# Patient Record
Sex: MALE | Race: WHITE | NOT HISPANIC OR LATINO
[De-identification: names, ages, dates, MRNs, and addresses within clinical notes are randomized per-mention and may not be internally consistent; named-entity substitution may affect disease eponyms.]

---

## 2020-12-10 ENCOUNTER — TRANSCRIPTION ENCOUNTER (OUTPATIENT)
Age: 85
End: 2020-12-10

## 2023-01-26 ENCOUNTER — INPATIENT (INPATIENT)
Facility: HOSPITAL | Age: 88
LOS: 1 days | Discharge: HOME | End: 2023-01-28
Attending: SURGERY | Admitting: SURGERY
Payer: MEDICARE

## 2023-01-26 ENCOUNTER — EMERGENCY (EMERGENCY)
Facility: HOSPITAL | Age: 88
LOS: 0 days | Discharge: ANOTHER HOSPITAL | End: 2023-01-26
Attending: STUDENT IN AN ORGANIZED HEALTH CARE EDUCATION/TRAINING PROGRAM
Payer: MEDICARE

## 2023-01-26 VITALS
RESPIRATION RATE: 19 BRPM | HEART RATE: 95 BPM | OXYGEN SATURATION: 100 % | SYSTOLIC BLOOD PRESSURE: 155 MMHG | TEMPERATURE: 98 F | DIASTOLIC BLOOD PRESSURE: 78 MMHG

## 2023-01-26 VITALS
WEIGHT: 184.97 LBS | TEMPERATURE: 98 F | RESPIRATION RATE: 18 BRPM | DIASTOLIC BLOOD PRESSURE: 86 MMHG | HEIGHT: 69 IN | OXYGEN SATURATION: 98 % | HEART RATE: 94 BPM | SYSTOLIC BLOOD PRESSURE: 122 MMHG

## 2023-01-26 VITALS
RESPIRATION RATE: 18 BRPM | SYSTOLIC BLOOD PRESSURE: 144 MMHG | HEART RATE: 88 BPM | DIASTOLIC BLOOD PRESSURE: 88 MMHG | OXYGEN SATURATION: 96 % | HEIGHT: 69 IN | TEMPERATURE: 98 F

## 2023-01-26 DIAGNOSIS — R53.83 OTHER FATIGUE: ICD-10-CM

## 2023-01-26 DIAGNOSIS — Z20.822 CONTACT WITH AND (SUSPECTED) EXPOSURE TO COVID-19: ICD-10-CM

## 2023-01-26 DIAGNOSIS — R53.1 WEAKNESS: ICD-10-CM

## 2023-01-26 DIAGNOSIS — R10.31 RIGHT LOWER QUADRANT PAIN: ICD-10-CM

## 2023-01-26 LAB
ALBUMIN SERPL ELPH-MCNC: 2.5 G/DL — LOW (ref 3.3–5)
ALP SERPL-CCNC: 96 U/L — SIGNIFICANT CHANGE UP (ref 40–120)
ALT FLD-CCNC: 41 U/L — SIGNIFICANT CHANGE UP (ref 12–78)
ANION GAP SERPL CALC-SCNC: 10 MMOL/L — SIGNIFICANT CHANGE UP (ref 5–17)
APPEARANCE UR: CLEAR — SIGNIFICANT CHANGE UP
AST SERPL-CCNC: 43 U/L — HIGH (ref 15–37)
BACTERIA # UR AUTO: ABNORMAL
BASOPHILS # BLD AUTO: 0.11 K/UL — SIGNIFICANT CHANGE UP (ref 0–0.2)
BASOPHILS NFR BLD AUTO: 0.6 % — SIGNIFICANT CHANGE UP (ref 0–2)
BILIRUB SERPL-MCNC: 1.3 MG/DL — HIGH (ref 0.2–1.2)
BILIRUB UR-MCNC: NEGATIVE — SIGNIFICANT CHANGE UP
BUN SERPL-MCNC: 10 MG/DL — SIGNIFICANT CHANGE UP (ref 7–23)
CALCIUM SERPL-MCNC: 9 MG/DL — SIGNIFICANT CHANGE UP (ref 8.5–10.1)
CHLORIDE SERPL-SCNC: 93 MMOL/L — LOW (ref 96–108)
CO2 SERPL-SCNC: 32 MMOL/L — HIGH (ref 22–31)
COLOR SPEC: YELLOW — SIGNIFICANT CHANGE UP
CREAT SERPL-MCNC: 0.76 MG/DL — SIGNIFICANT CHANGE UP (ref 0.5–1.3)
DIFF PNL FLD: NEGATIVE — SIGNIFICANT CHANGE UP
EGFR: 88 ML/MIN/1.73M2 — SIGNIFICANT CHANGE UP
EOSINOPHIL # BLD AUTO: 0.04 K/UL — SIGNIFICANT CHANGE UP (ref 0–0.5)
EOSINOPHIL NFR BLD AUTO: 0.2 % — SIGNIFICANT CHANGE UP (ref 0–6)
EPI CELLS # UR: SIGNIFICANT CHANGE UP
FLUAV AG NPH QL: SIGNIFICANT CHANGE UP
FLUBV AG NPH QL: SIGNIFICANT CHANGE UP
GLUCOSE SERPL-MCNC: 143 MG/DL — HIGH (ref 70–99)
GLUCOSE UR QL: NEGATIVE MG/DL — SIGNIFICANT CHANGE UP
HCT VFR BLD CALC: 40.9 % — SIGNIFICANT CHANGE UP (ref 39–50)
HGB BLD-MCNC: 14 G/DL — SIGNIFICANT CHANGE UP (ref 13–17)
IMM GRANULOCYTES NFR BLD AUTO: 1.2 % — HIGH (ref 0–0.9)
KETONES UR-MCNC: ABNORMAL
LACTATE SERPL-SCNC: 1.4 MMOL/L — SIGNIFICANT CHANGE UP (ref 0.7–2)
LEUKOCYTE ESTERASE UR-ACNC: NEGATIVE — SIGNIFICANT CHANGE UP
LIDOCAIN IGE QN: 138 U/L — SIGNIFICANT CHANGE UP (ref 73–393)
LYMPHOCYTES # BLD AUTO: 1.59 K/UL — SIGNIFICANT CHANGE UP (ref 1–3.3)
LYMPHOCYTES # BLD AUTO: 9.1 % — LOW (ref 13–44)
MAGNESIUM SERPL-MCNC: 2.2 MG/DL — SIGNIFICANT CHANGE UP (ref 1.6–2.6)
MCHC RBC-ENTMCNC: 32.9 PG — SIGNIFICANT CHANGE UP (ref 27–34)
MCHC RBC-ENTMCNC: 34.2 G/DL — SIGNIFICANT CHANGE UP (ref 32–36)
MCV RBC AUTO: 96 FL — SIGNIFICANT CHANGE UP (ref 80–100)
MONOCYTES # BLD AUTO: 0.97 K/UL — HIGH (ref 0–0.9)
MONOCYTES NFR BLD AUTO: 5.6 % — SIGNIFICANT CHANGE UP (ref 2–14)
NEUTROPHILS # BLD AUTO: 14.5 K/UL — HIGH (ref 1.8–7.4)
NEUTROPHILS NFR BLD AUTO: 83.3 % — HIGH (ref 43–77)
NITRITE UR-MCNC: NEGATIVE — SIGNIFICANT CHANGE UP
NRBC # BLD: 0 /100 WBCS — SIGNIFICANT CHANGE UP (ref 0–0)
PH UR: 6 — SIGNIFICANT CHANGE UP (ref 5–8)
PLATELET # BLD AUTO: 536 K/UL — HIGH (ref 150–400)
POTASSIUM SERPL-MCNC: 2.9 MMOL/L — CRITICAL LOW (ref 3.5–5.3)
POTASSIUM SERPL-SCNC: 2.9 MMOL/L — CRITICAL LOW (ref 3.5–5.3)
PROT SERPL-MCNC: 7.4 GM/DL — SIGNIFICANT CHANGE UP (ref 6–8.3)
PROT UR-MCNC: 30 MG/DL
RBC # BLD: 4.26 M/UL — SIGNIFICANT CHANGE UP (ref 4.2–5.8)
RBC # FLD: 14.1 % — SIGNIFICANT CHANGE UP (ref 10.3–14.5)
RBC CASTS # UR COMP ASSIST: SIGNIFICANT CHANGE UP /HPF (ref 0–4)
SARS-COV-2 RNA SPEC QL NAA+PROBE: SIGNIFICANT CHANGE UP
SODIUM SERPL-SCNC: 135 MMOL/L — SIGNIFICANT CHANGE UP (ref 135–145)
SP GR SPEC: 1.02 — SIGNIFICANT CHANGE UP (ref 1.01–1.02)
UROBILINOGEN FLD QL: 8 MG/DL
WBC # BLD: 17.42 K/UL — HIGH (ref 3.8–10.5)
WBC # FLD AUTO: 17.42 K/UL — HIGH (ref 3.8–10.5)
WBC UR QL: SIGNIFICANT CHANGE UP

## 2023-01-26 PROCEDURE — 99285 EMERGENCY DEPT VISIT HI MDM: CPT

## 2023-01-26 PROCEDURE — 70450 CT HEAD/BRAIN W/O DYE: CPT | Mod: 26,MA

## 2023-01-26 PROCEDURE — 93010 ELECTROCARDIOGRAM REPORT: CPT

## 2023-01-26 PROCEDURE — 76700 US EXAM ABDOM COMPLETE: CPT | Mod: 26

## 2023-01-26 PROCEDURE — 74177 CT ABD & PELVIS W/CONTRAST: CPT | Mod: 26,MA

## 2023-01-26 PROCEDURE — 99283 EMERGENCY DEPT VISIT LOW MDM: CPT

## 2023-01-26 RX ORDER — POTASSIUM CHLORIDE 20 MEQ
10 PACKET (EA) ORAL ONCE
Refills: 0 | Status: COMPLETED | OUTPATIENT
Start: 2023-01-26 | End: 2023-01-26

## 2023-01-26 RX ORDER — PIPERACILLIN AND TAZOBACTAM 4; .5 G/20ML; G/20ML
3.38 INJECTION, POWDER, LYOPHILIZED, FOR SOLUTION INTRAVENOUS ONCE
Refills: 0 | Status: COMPLETED | OUTPATIENT
Start: 2023-01-26 | End: 2023-01-26

## 2023-01-26 RX ORDER — SODIUM CHLORIDE 9 MG/ML
1000 INJECTION INTRAMUSCULAR; INTRAVENOUS; SUBCUTANEOUS ONCE
Refills: 0 | Status: COMPLETED | OUTPATIENT
Start: 2023-01-26 | End: 2023-01-26

## 2023-01-26 RX ORDER — POTASSIUM CHLORIDE 20 MEQ
40 PACKET (EA) ORAL ONCE
Refills: 0 | Status: COMPLETED | OUTPATIENT
Start: 2023-01-26 | End: 2023-01-26

## 2023-01-26 RX ADMIN — Medication 100 MILLIEQUIVALENT(S): at 12:37

## 2023-01-26 RX ADMIN — SODIUM CHLORIDE 1000 MILLILITER(S): 9 INJECTION INTRAMUSCULAR; INTRAVENOUS; SUBCUTANEOUS at 11:32

## 2023-01-26 RX ADMIN — Medication 40 MILLIEQUIVALENT(S): at 12:36

## 2023-01-26 RX ADMIN — SODIUM CHLORIDE 1000 MILLILITER(S): 9 INJECTION INTRAMUSCULAR; INTRAVENOUS; SUBCUTANEOUS at 12:32

## 2023-01-26 RX ADMIN — PIPERACILLIN AND TAZOBACTAM 200 GRAM(S): 4; .5 INJECTION, POWDER, LYOPHILIZED, FOR SOLUTION INTRAVENOUS at 15:25

## 2023-01-26 RX ADMIN — Medication 10 MILLIEQUIVALENT(S): at 13:37

## 2023-01-26 RX ADMIN — PIPERACILLIN AND TAZOBACTAM 3.38 GRAM(S): 4; .5 INJECTION, POWDER, LYOPHILIZED, FOR SOLUTION INTRAVENOUS at 15:55

## 2023-01-26 NOTE — CONSULT NOTE ADULT - SUBJECTIVE AND OBJECTIVE BOX
GENERAL SURGERY CONSULT NOTE    Patient is a 88 year old male who presents with a chief complaint of weakness.    HPI: 89 y/o male with no PMHx or PSHx bought in by his son for weakness. Per patient, he has no abdominal pain, complaints, or weakness. Tolerating diet. Normal BM/flatus. Patient denies fever, chills, nausea, vomiting, constipation, diarrhea, melena, hematochezia, dysuria, hematuria, chest pain, shortness of breath, dizziness, cough. Patient denies prior incident.     REVIEW OF SYSTEMS:  CONSTITUTIONAL: No fever, weight loss, or fatigue  EYES: No eye pain, visual disturbances, discharge  ENMT:  No difficulty hearing, tinnitus, vertigo; No sinus or throat pain  NECK: No pain or stiffness  BREASTS: No pain, masses, or nipple discharge  RESPIRATORY: No cough, wheezing, chills or hemoptysis; No shortness of breath  CARDIOVASCULAR: No chest pain, palpitations, dizziness, or leg swelling  GASTROINTESTINAL: No abdominal or epigastric pain. No nausea, vomiting, or hematemesis; No diarrhea or constipation. No melena or hematochezia.  GENITOURINARY: No dysuria, frequency, hematuria, or incontinence  NEUROLOGICAL: No headaches, memory loss, loss of strength, numbness, or tremors  SKIN: No itching, burning, rashes, or lesions   LYMPH NODES: No enlarged glands  ENDOCRINE: No heat or cold intolerance; No hair loss  MUSCULOSKELETAL: No joint pain or swelling; No muscle, back, or extremity pain  PSYCHIATRIC: No depression, anxiety, mood swings, or difficulty sleeping  HEME/LYMPH: No easy bruising, or bleeding gums  ALLERY AND IMMUNOLOGIC: No hives or eczema     PAST MEDICAL & SURGICAL HISTORY: No hx.    MEDICATIONS: No medications.    Allergies  No Known Allergies    SOCIAL HISTORY: Former smoker, quit many years ago. Denies ETOH or drugs.           FAMILY HISTORY: No known family hx.      Vital Signs Last 24 Hrs  T(C): 36.7 (2023 15:20), Max: 36.7 (2023 15:20)  T(F): 98 (2023 15:20), Max: 98 (2023 15:20)  HR: 78 (2023 18:15) (67 - 94)  BP: 153/91 (2023 18:15) (122/86 - 153/91)  BP(mean): --  RR: 22 (2023 18:15) (16 - 22)  SpO2: 96% (2023 18:15) (96% - 99%)    Parameters below as of 2023 18:15  Patient On (Oxygen Delivery Method): room air      PHYSICAL EXAM:  CONSTITUTIONAL: NAD, well-developed  HEAD:  Atraumatic, Normocephalic  EYES: Conjunctiva and sclera clear  ENMT: No tonsillar erythema, exudates, or enlargement; Moist mucous membranes, No lesions  NECK: Supple, No JVD, Normal thyroid  NERVOUS SYSTEM:  Alert & Oriented X3  RESPIRATORY: Clear to auscultation bilaterally; No rales, rhonchi, wheezing  CARDIOVASCULAR: Regular rate and rhythm. S1S2  GASTROINTESTINAL: Nondistended, +BS, soft, nontender, no guarding, no rigidity   MUSCULOSKELETAL: 2+ Peripheral Pulses, No clubbing, cyanosis, or edema     LABS:                        14.0   17.42 )-----------( 536      ( 2023 11:25 )             40.9         135  |  93<L>  |  10  ----------------------------<  143<H>  2.9<LL>   |  32<H>  |  0.76    Ca    9.0      2023 11:25  Mg     2.2         TPro  7.4  /  Alb  2.5<L>  /  TBili  1.3<H>  /  DBili  x   /  AST  43<H>  /  ALT  41  /  AlkPhos  96        Urinalysis Basic - ( 2023 12:54 )    Color: Yellow / Appearance: Clear / S.020 / pH: x  Gluc: x / Ketone: Small  / Bili: Negative / Urobili: 8 mg/dL   Blood: x / Protein: 30 mg/dL / Nitrite: Negative   Leuk Esterase: Negative / RBC: 0-2 /HPF / WBC 0-2   Sq Epi: x / Non Sq Epi: Few / Bacteria: Few    < from: US Abdomen Complete (23 @ 17:21) >  FINDINGS:  Liver: Within normal limits.  Bile ducts: Normal caliber.  Gallbladder: Not visualized. (Findings on CT consistent with generalized   inflammatory process. May obtain cholescintigraphy as clinically   indicated)  Pancreas: Peripancreatic inflammation  Spleen: . Within normal limits.  Right kidney: 12.2 cm. No hydronephrosis.  Left kidney: 12.5 cm. No hydronephrosis. 2.5 cm cyst  Ascites: Partially visualized collection medial to the spleen as   demonstrated on prior CT scan.  Aorta and IVC: Visualized portions are within normal limits.    IMPRESSION:  Gallbladder not visualized.    Partially visualized peripancreatic inflammation as previously   demonstrated    < end of copied text >    < from: CT Head No Cont (23 @ 13:58) >    IMPRESSION:    1)  chronic ischemic changes with volume loss. No acute abnormality   suggested..  2)  no intracerebral hemorrhage, contusion, or acute hemorrhagic   collections identified.    < end of copied text >    < from: CT Abdomen and Pelvis w/ IV Cont (23 @ 13:58) >  FINDINGS:  LOWER CHEST: Bibasilar atelectasis. Ascending aortic aneurysm measuring   5.8 x 5.8 cm, partially visualized. Coronary artery calcifications.    LIVER: Within normal limits.  BILE DUCTS: Normal caliber.  GALLBLADDER: Cholelithiasis. Nonspecific diffuse gallbladder wall   thickening.  SPLEEN: Within normal limits.  PANCREAS: Moderate, loculated peripancreatic fluid collection which   extends into the splenic hilum, along the mesenteric root, and along the   SMV branches extending to the right lower quadrant, overall measuring   approximately 18.7 x 18.4 cm in extent. Normal pancreatic parenchymal   enhancement. No pancreatic ductal dilatation. Scattered pancreatic   parenchymal calcifications, likely sequela of chronic.  ADRENALS: Within normal limits.  KIDNEYS/URETERS: Bilateral renal cysts measuring up to 4.9 cm and the   right kidney. No hydronephrosis    BLADDER: Mild diffuse bladder wall thickening and trabeculation.  REPRODUCTIVE ORGANS: Prostate is enlarged.    BOWEL: No bowel obstruction. Appendix is normal. Colonic diverticulosis   without evidence of acute diverticulitis.  PERITONEUM: Trace pelvic free fluid.  VESSELS: Atherosclerotic changes.  RETROPERITONEUM/LYMPH NODES: No lymphadenopathy.  ABDOMINAL WALL: Within normal limits.  BONES: Degenerative changes.    IMPRESSION:  18.7 cm loculated yessica-pancreatic collection, which extends into the   splenic hilum, along the mesenteric root and along branches of the SMV   extending to the right lower quadrant. Findings likely secondary to   recent acute interstitial pancreatitis, with no findings to suggest   pancreatic or peripancreatic necrosis.    Cholelithiasis. Nonspecific diffuse gallbladder wall thickening. If there   is concern for acute cholecystitis, recommend right upper quadrant   ultrasound.    Partially visualized, ascending aortic aneurysm measuring up to 5.8 cm in   diameter.    < end of copied text >

## 2023-01-26 NOTE — ED ADULT TRIAGE NOTE - CCCP TRG CHIEF CMPLNT
· Refill requested by: Patient  · Last office visit for Cardio/Vasodilators: 3/15/2021   · Next office visit: none   · Medication(s) Requested: trandolapril 4 mg, verapamil 240 mg   · Last refill: 4/9/2021  · Dosage: see above   · Sig:  Take 1 tab by mouth daily   · Quantity requested: 90  Last 2 blood pressure readings:    BP Readings from Last 2 Encounters:   03/30/21 118/70   03/15/21 100/62     BP<140/90 at last OV/Nurse Visit  Refill if seen within the last 6 months  Filled per protocol    
transfer

## 2023-01-26 NOTE — CONSULT NOTE ADULT - ASSESSMENT
89 y/o male with no PMHx or PSHx bought in by his son for weakness. CT shows 18.7 cm loculated yessica-pancreatic collection.    Plan:   Recommend transfer to tertiary facility   Discussed with Dr. Joyner

## 2023-01-26 NOTE — ED ADULT TRIAGE NOTE - CHIEF COMPLAINT QUOTE
Pt transfer from [facility], c/o generalized weakness, fatigue, loss of appetite and RLQ abd pain, per imaging found to have pancreatitis and ascending aortic aneurysm, here for surgical consult. Arrived w/ 20G IV on left forearm. Denies PMH

## 2023-01-26 NOTE — ED ADULT NURSE NOTE - NSIMPLEMENTINTERV_GEN_ALL_ED
At risk for secondary malignancy At risk for secondary malignancy Implemented All Fall with Harm Risk Interventions:  [place] to call system. Call bell, personal items and telephone within reach. Instruct patient to call for assistance. Room bathroom lighting operational. Non-slip footwear when patient is off stretcher. Physically safe environment: no spills, clutter or unnecessary equipment. Stretcher in lowest position, wheels locked, appropriate side rails in place. Provide visual cue, wrist band, yellow gown, etc. Monitor gait and stability. Monitor for mental status changes and reorient to person, place, and time. Review medications for side effects contributing to fall risk. Reinforce activity limits and safety measures with patient and family. Provide visual clues: red socks.

## 2023-01-26 NOTE — ED PROVIDER NOTE - PHYSICAL EXAMINATION
General appearance: Nontoxic appearing, conversant, afebrile    Eyes: anicteric sclerae, PRINCESS, EOMI   HENT: Atraumatic; oropharynx clear, MMM and no ulcerations, no pharyngeal erythema or exudate   Neck: Trachea midline; Full range of motion, supple   Pulm: CTA bl, normal respiratory effort and no intercostal retractions, normal work of breathing   CV: RRR, No murmurs, rubs, or gallops   Abdomen: Soft, mild rlq tender, non-distended; no guarding or rebound   Extremities: No peripheral edema, no gross deformities, FROM x4   Skin: Dry, normal temperature, turgor and texture; no rash   Psych: Appropriate affect, cooperative

## 2023-01-26 NOTE — ED ADULT NURSE NOTE - OBJECTIVE STATEMENT
88M presents to the ED with intermittent weakness x 2 weeks, low appetite, also fell last night, patient states that he did not hit his head but is unsure how he fell. denies pain, headache, blurry vision

## 2023-01-26 NOTE — ED PROVIDER NOTE - OBJECTIVE STATEMENT
87yo male with no pmh presenting with generalized weakness, abdominal pain.  Has been feeling fatigued and generalized weakness for some time but starting 1/20 began having abdominal pain.  Pain waxing and waning and mainly rlq.  Went to UC and given a few medications, abx? but unsure which.  Says with one of the medications he felt like he was hallucinating so stopped.  Here today for persistent symptoms and he fell last night.  Denies head strike, pain from fall, no ac use, no fevers, cp, sob, n/v/d, urinary symptoms.  No pshx.

## 2023-01-26 NOTE — ED PROVIDER NOTE - CLINICAL SUMMARY MEDICAL DECISION MAKING FREE TEXT BOX
Presenting with progressive generalized weakness, fatigue.  Abdominal pain with mild rlq ttp although ongoing for several days.  Lower suspicion acute intraabdominal pathology but will image given age.  + Fall patient states from the generalized weakness, no signs of significant traumatic injuries and denies pain.  Afebrile.  Plan for labs with CT, ivf.  Offered pain meds and patient declined.  Reassess after results for dispo.

## 2023-01-26 NOTE — ED ADULT TRIAGE NOTE - CHIEF COMPLAINT QUOTE
pt c/o generalize weakness, fatigue, loss of appetite and intermittent confusion for 2 weeks. pt had covid 6 weeks ago. as per sons, no history.

## 2023-01-26 NOTE — ED PROVIDER NOTE - CARE PLAN
Is This A New Presentation, Or A Follow-Up?: Skin Lesion Additional History: PT comes in with a spot on his nose. 1 Principal Discharge DX:	Abdominal pain

## 2023-01-26 NOTE — ED ADULT NURSE NOTE - NS ED NURSE DISCH DISPOSITION
Implemented All Universal Safety Interventions:  [place] to call system. Call bell, personal items and telephone within reach. Instruct patient to call for assistance. Room bathroom lighting operational. Non-slip footwear when patient is off stretcher. Physically safe environment: no spills, clutter or unnecessary equipment. Stretcher in lowest position, wheels locked, appropriate side rails in place. Transferred

## 2023-01-26 NOTE — ED PROVIDER NOTE - PROGRESS NOTE DETAILS
[place]: CT findings noted.  D/w surgery here and would recommend transfer for higher level of care.  Remains stable at this time.  Discussed plan with patient and family and answered questions.

## 2023-01-27 DIAGNOSIS — K85.90 ACUTE PANCREATITIS WITHOUT NECROSIS OR INFECTION, UNSPECIFIED: ICD-10-CM

## 2023-01-27 LAB
ALBUMIN SERPL ELPH-MCNC: 3 G/DL — LOW (ref 3.3–5)
ALP SERPL-CCNC: 85 U/L — SIGNIFICANT CHANGE UP (ref 40–120)
ALT FLD-CCNC: 25 U/L — SIGNIFICANT CHANGE UP (ref 4–41)
ANION GAP SERPL CALC-SCNC: 11 MMOL/L — SIGNIFICANT CHANGE UP (ref 7–14)
AST SERPL-CCNC: 39 U/L — SIGNIFICANT CHANGE UP (ref 4–40)
BILIRUB SERPL-MCNC: 0.8 MG/DL — SIGNIFICANT CHANGE UP (ref 0.2–1.2)
BUN SERPL-MCNC: 10 MG/DL — SIGNIFICANT CHANGE UP (ref 7–23)
CALCIUM SERPL-MCNC: 8.7 MG/DL — SIGNIFICANT CHANGE UP (ref 8.4–10.5)
CHLORIDE SERPL-SCNC: 96 MMOL/L — LOW (ref 98–107)
CO2 SERPL-SCNC: 26 MMOL/L — SIGNIFICANT CHANGE UP (ref 22–31)
CREAT SERPL-MCNC: 0.57 MG/DL — SIGNIFICANT CHANGE UP (ref 0.5–1.3)
CULTURE RESULTS: SIGNIFICANT CHANGE UP
EGFR: 95 ML/MIN/1.73M2 — SIGNIFICANT CHANGE UP
GLUCOSE SERPL-MCNC: 108 MG/DL — HIGH (ref 70–99)
HCT VFR BLD CALC: 37.4 % — LOW (ref 39–50)
HGB BLD-MCNC: 12.3 G/DL — LOW (ref 13–17)
MAGNESIUM SERPL-MCNC: 1.9 MG/DL — SIGNIFICANT CHANGE UP (ref 1.6–2.6)
MCHC RBC-ENTMCNC: 31.9 PG — SIGNIFICANT CHANGE UP (ref 27–34)
MCHC RBC-ENTMCNC: 32.9 GM/DL — SIGNIFICANT CHANGE UP (ref 32–36)
MCV RBC AUTO: 96.9 FL — SIGNIFICANT CHANGE UP (ref 80–100)
NRBC # BLD: 0 /100 WBCS — SIGNIFICANT CHANGE UP (ref 0–0)
NRBC # FLD: 0 K/UL — SIGNIFICANT CHANGE UP (ref 0–0)
PHOSPHATE SERPL-MCNC: 3 MG/DL — SIGNIFICANT CHANGE UP (ref 2.5–4.5)
PLATELET # BLD AUTO: 464 K/UL — HIGH (ref 150–400)
POTASSIUM SERPL-MCNC: 3.1 MMOL/L — LOW (ref 3.5–5.3)
POTASSIUM SERPL-SCNC: 3.1 MMOL/L — LOW (ref 3.5–5.3)
PROT SERPL-MCNC: 6.3 G/DL — SIGNIFICANT CHANGE UP (ref 6–8.3)
RBC # BLD: 3.86 M/UL — LOW (ref 4.2–5.8)
RBC # FLD: 13.9 % — SIGNIFICANT CHANGE UP (ref 10.3–14.5)
SODIUM SERPL-SCNC: 133 MMOL/L — LOW (ref 135–145)
SPECIMEN SOURCE: SIGNIFICANT CHANGE UP
WBC # BLD: 16.31 K/UL — HIGH (ref 3.8–10.5)
WBC # FLD AUTO: 16.31 K/UL — HIGH (ref 3.8–10.5)

## 2023-01-27 RX ORDER — SODIUM CHLORIDE 9 MG/ML
1000 INJECTION, SOLUTION INTRAVENOUS
Refills: 0 | Status: DISCONTINUED | OUTPATIENT
Start: 2023-01-27 | End: 2023-01-27

## 2023-01-27 RX ORDER — POTASSIUM CHLORIDE 20 MEQ
40 PACKET (EA) ORAL ONCE
Refills: 0 | Status: COMPLETED | OUTPATIENT
Start: 2023-01-27 | End: 2023-01-27

## 2023-01-27 RX ORDER — HALOPERIDOL DECANOATE 100 MG/ML
2.5 INJECTION INTRAMUSCULAR ONCE
Refills: 0 | Status: COMPLETED | OUTPATIENT
Start: 2023-01-27 | End: 2023-01-27

## 2023-01-27 RX ORDER — HALOPERIDOL DECANOATE 100 MG/ML
2.5 INJECTION INTRAMUSCULAR ONCE
Refills: 0 | Status: DISCONTINUED | OUTPATIENT
Start: 2023-01-27 | End: 2023-01-27

## 2023-01-27 RX ORDER — HEPARIN SODIUM 5000 [USP'U]/ML
5000 INJECTION INTRAVENOUS; SUBCUTANEOUS EVERY 8 HOURS
Refills: 0 | Status: DISCONTINUED | OUTPATIENT
Start: 2023-01-27 | End: 2023-01-28

## 2023-01-27 RX ADMIN — SODIUM CHLORIDE 75 MILLILITER(S): 9 INJECTION, SOLUTION INTRAVENOUS at 04:14

## 2023-01-27 RX ADMIN — HEPARIN SODIUM 5000 UNIT(S): 5000 INJECTION INTRAVENOUS; SUBCUTANEOUS at 21:07

## 2023-01-27 RX ADMIN — SODIUM CHLORIDE 50 MILLILITER(S): 9 INJECTION, SOLUTION INTRAVENOUS at 08:56

## 2023-01-27 RX ADMIN — HEPARIN SODIUM 5000 UNIT(S): 5000 INJECTION INTRAVENOUS; SUBCUTANEOUS at 06:45

## 2023-01-27 RX ADMIN — HALOPERIDOL DECANOATE 2.5 MILLIGRAM(S): 100 INJECTION INTRAMUSCULAR at 02:30

## 2023-01-27 RX ADMIN — Medication 40 MILLIEQUIVALENT(S): at 08:56

## 2023-01-27 RX ADMIN — HEPARIN SODIUM 5000 UNIT(S): 5000 INJECTION INTRAVENOUS; SUBCUTANEOUS at 15:11

## 2023-01-27 NOTE — H&P ADULT - NSHPPHYSICALEXAM_GEN_ALL_CORE
PHYSICAL EXAM:    GENERAL: NAD, well-groomed, well-developed  HEAD:  Atraumatic, Normocephalic  EYES: EOMI, PERRLA, conjunctiva and sclera clear  ENMT: No tonsillar erythema, exudates, or enlargement; Moist mucous membranes  NECK: Supple, No JVD, Normal thyroid  HEART: Regular rate and rhythm; No murmurs, rubs, or gallops  RESPIRATORY: CTA B/L, No W/R/R  ABDOMEN: Soft, Nontender, mild dicomfort to palpation near periumblical and epigastric region   NEUROLOGY: A&Ox3, nonfocal, moving all extremities  EXTREMITIES:  2+ Peripheral Pulses, No clubbing, cyanosis, or edema  SKIN: warm, dry, normal color, no rash or abnormal lesions

## 2023-01-27 NOTE — H&P ADULT - ASSESSMENT
87 y/o male with no PMHx or PSHx bought in by family for weakness to [facility]. Tx for noted 18x18 cm peripancreatic fluid collection extending into splenic hilum, along SMV branches, with scattered pancreatic parenchymal calcification suggesting an acute on chronic pancreatitis picture, however patient denies hx of pancreatitis or pain at this time. Also noted 4/9 cm R renal cyst and 5.8 cm ascending aortic aneurysm and gallstones on scan. Denies EtOH hx. Transferred from [place] to [facility] for evaluation of collection.     - no acute surgical intervention at this time  - peripancreatic fluid collection suggestive of a recent acute interstitial pancreatitis, timeline unclear   - CLD  - pain control  - requires med rec in AM, patient states he does not take medications but is not sure  - admit to Dr. Kareem Jain, B [facility] Surgery    d/w Dr. Jain, surgical attending    B Team Surgery, [id number] never used

## 2023-01-27 NOTE — PROGRESS NOTE ADULT - ASSESSMENT
88M w/no PMHx or PSHx bought in by family for weakness to [facility]. CT with 18x18 cm peripancreatic fluid collection extending into splenic hilum, along SMV branches, with scattered pancreatic parenchymal calcification suggesting an acute on chronic pancreatitis picture, however patient denies hx of pancreatitis or pain at this time. Also noted 4.9 cm R renal cyst and 5.8 cm ascending aortic aneurysm and gallstones on scan. Denies EtOH hx.    Plan:  - no acute surgical intervention at this time  - peripancreatic fluid collection suggestive of a recent acute interstitial pancreatitis, timeline unclear   - Advance to regular diet, w/IVF  - pain control PRN  - c/w tele  - updated med rec    B Team Surgery  [id number]

## 2023-01-27 NOTE — ED PROVIDER NOTE - CLINICAL SUMMARY MEDICAL DECISION MAKING FREE TEXT BOX
transfer from [facility] for pancreatitis and abdominal aortic aneurysm on CT. pt presented with mild epigastric pain, non specific sx. CT results:  18.7 cm loculated yessica-pancreatic collection, which extends into the  splenic hilum, along the mesenteric root and along branches of the SMV   extending to the right lower quadrant. Findings likely secondary to   recent acute interstitial pancreatitis, with no findings to suggest   pancreatic or peripancreatic necrosis. Cholelithiasis. Nonspecific diffuse gallbladder wall thickening.   Partially visualized, ascending aortic aneurysm measuring up to 5.8 cm in   diameter. consulting surgery, and vascular surgery. admit.

## 2023-01-27 NOTE — PATIENT PROFILE ADULT - OVER THE PAST TWO WEEKS HAVE YOU FELT DOWN, DEPRESSED OR HOPELESS?
2002 left leg stent    Acute myocardial infarction  as per pt in 2001, no coronary stent  Cataract surgery 30 years ago    Exposure to radiation  for prostate cancer (seed implant)  History of colon surgery  2007  right wrist surgery with hardware 27 years ago no

## 2023-01-27 NOTE — PROGRESS NOTE ADULT - SUBJECTIVE AND OBJECTIVE BOX
TEAM Surgery Progress Note    SUBJECTIVE: Patient seen and examined at bedside with surgical team, newly transferred to floor. Patient without significant abdominal complaint. Denies fever, chills, CP, SOB, nausea, vomiting. Patient confused how he was brought to the hospital, conversational but marginally disoriented.    OBJECTIVE:  Vital Signs Last 24 Hrs  T(C): 36.6 (2023 08:27), Max: 36.8 (2023 05:21)  T(F): 97.8 (2023 08:27), Max: 98.2 (2023 05:21)  HR: 65 (2023 08:27) (65 - 95)  BP: 135/71 (2023 08:27) (107/59 - 155/78)  RR: 16 (2023 08:27) (16 - 22)  SpO2: 95% (2023 08:27) (95% - 100%)  Parameters below as of 2023 08:27  Patient On (Oxygen Delivery Method): room air    I&O's Detail  MEDICATIONS  (STANDING):  heparin   Injectable 5000 Unit(s) SubCutaneous every 8 hours  lactated ringers. 1000 milliLiter(s) (50 mL/Hr) IV Continuous <Continuous>  potassium chloride    Tablet ER 40 milliEquivalent(s) Oral once    PHYSICAL EXAM:  GENERAL: NAD, well-groomed, well-developed  HEAD:  Atraumatic, Normocephalic  EYES: EOMI, PERRLA, conjunctiva and sclera clear  ENMT: No tonsillar erythema, exudates, or enlargement; Moist mucous membranes  NECK: Supple, No JVD, Normal thyroid  HEART: Regular rate and rhythm; No murmurs, rubs, or gallops  RESPIRATORY: CTA B/L, No W/R/R  ABDOMEN: Soft, Nontender, mild discomfort to palpation near periumblical and epigastric region   NEUROLOGY: A&Ox3, nonfocal, moving all extremities  EXTREMITIES:  2+ Peripheral Pulses, No clubbing, cyanosis, or edema  SKIN: warm, dry, normal color, no rash or abnormal lesions    LABS:             12.3   16.31 )-----------( 464      ( 2023 03:45 )             37.4         133<L>  |  96<L>  |  10  ----------------------------<  108<H>  3.1<L>   |  26  |  0.57    Ca    8.7      2023 03:45  Phos  3.0       Mg     1.90         TPro  6.3  /  Alb  3.0<L>  /  TBili  0.8  /  DBili  x   /  AST  39  /  ALT  25  /  AlkPhos  85      Urinalysis Basic - ( 2023 12:54 )  Color: Yellow / Appearance: Clear / S.020 / pH: x  Gluc: x / Ketone: Small  / Bili: Negative / Urobili: 8 mg/dL   Blood: x / Protein: 30 mg/dL / Nitrite: Negative   Leuk Esterase: Negative / RBC: 0-2 /HPF / WBC 0-2   Sq Epi: x / Non Sq Epi: Few / Bacteria: Few    IMAGING:  ACC: [id number] EXAM: US ABDOMEN COMPLETE ORDERED BY: REYNALDO GARCIA  PROCEDURE DATE: 2023  INTERPRETATION: CLINICAL INFORMATION: 88-year-old man with abdominal pain  COMPARISON: CT 2023  TECHNIQUE: Sonography of the abdomen.  FINDINGS:  Liver: Within normal limits.  Bile ducts: Normal caliber.  Gallbladder: Not visualized. (Findings on CT consistent with generalized inflammatory process. May obtain cholescintigraphy as clinically indicated)  Pancreas: Peripancreatic inflammation  Spleen: . Within normal limits.  Right kidney: 12.2 cm. No hydronephrosis.  Left kidney: 12.5 cm. No hydronephrosis. 2.5 cm cyst  Ascites: Partially visualized collection medial to the spleen as demonstrated on prior CT scan.  Aorta and IVC: Visualized portions are within normal limits.  IMPRESSION:  Gallbladder not visualized. Partially visualized peripancreatic inflammation as previously demonstrated.    ACC: [id number] EXAM: CT ABDOMEN AND PELVIS IC ORDERED BY: REYNALDO MARX  PROCEDURE DATE: 2023  INTERPRETATION: CLINICAL INFORMATION: Right lower quadrant abdominal pain.  COMPARISON: None.  CONTRAST/COMPLICATIONS:  IV Contrast: Omnipaque 350 90 cc administered 10 cc discarded  Oral Contrast: NONE  Complications: None reported at time of study completion  PROCEDURE:  CT of the Abdomen and Pelvis was performed.  Sagittal and coronal reformats were performed.  FINDINGS:  LOWER CHEST: Bibasilar atelectasis. Ascending aortic aneurysm measuring 5.8 x 5.8 cm, partially visualized. Coronary artery calcifications.  LIVER: Within normal limits.  BILE DUCTS: Normal caliber.  GALLBLADDER: Cholelithiasis. Nonspecific diffuse gallbladder wall thickening.  SPLEEN: Within normal limits.  PANCREAS: Moderate, loculated peripancreatic fluid collection which extends into the splenic hilum, along the mesenteric root, and along the SMV branches extending to the right lower quadrant, overall measuring approximately 18.7 x 18.4 cm in extent. Normal pancreatic parenchymal enhancement. No pancreatic ductal dilatation. Scattered pancreatic parenchymal calcifications, likely sequela of chronic.  ADRENALS: Within normal limits.  KIDNEYS/URETERS: Bilateral renal cysts measuring up to 4.9 cm and the right kidney. No hydronephrosis  BLADDER: Mild diffuse bladder wall thickening and trabeculation.  REPRODUCTIVE ORGANS: Prostate is enlarged.  BOWEL: No bowel obstruction. Appendix is normal. Colonic diverticulosis without evidence of acute diverticulitis.  PERITONEUM: Trace pelvic free fluid.  VESSELS: Atherosclerotic changes.  RETROPERITONEUM/LYMPH NODES: No lymphadenopathy.  ABDOMINAL WALL: Within normal limits.  BONES: Degenerative changes.  IMPRESSION:  18.7 cm loculated yessica-pancreatic collection, which extends into the splenic hilum, along the mesenteric root and along branches of the SMV extending to the right lower quadrant. Findings likely secondary to recent acute interstitial pancreatitis, with no findings to suggest pancreatic or peripancreatic necrosis. Cholelithiasis. Nonspecific diffuse gallbladder wall thickening. If there is concern for acute cholecystitis, recommend right upper quadrant ultrasound. Partially visualized, ascending aortic aneurysm measuring up to 5.8 cm in diameter.

## 2023-01-27 NOTE — ED PROVIDER NOTE - OBJECTIVE STATEMENT
89yo male with no pmh presenting with generalized weakness, abdominal pain.  Has been feeling fatigued and generalized weakness for some time but starting 1/20 began having abdominal pain.  Here today for persistent symptoms and he fell last night.  Denies head strike, pain from fall, no ac use, no fevers, cp, sob, n/v/d, urinary symptoms.  No pshx, trauma. transfer from [place]. on arrival vitals wnl, no active complaints. 87yo male with no pmh presenting with generalized weakness, abdominal pain.  Has been feeling fatigued and generalized weakness for some time but starting 1/20 began having abdominal pain.  Here today for persistent symptoms and he fell last night.  Denies head strike, pain from fall, no ac use, no fevers, cp, sob, n/v/d, urinary symptoms.  No pshx, trauma. transfer from [place] for AAA and peripancreatic collection on CT performed there. on arrival vitals wnl, no active complaints.

## 2023-01-27 NOTE — ED ADULT NURSE NOTE - OBJECTIVE STATEMENT
Patient A&Ox3 to self, time and place but unsure of situation coming in as a transfer from [facility] for surgery consult. Patient went to ED for c/o abdominal pain, weakness and fatigue. Patient received CT scan at  (please see results). On arrival patient calm and cooperative, vitals stable. Respirations even and unlabored. IV line observed wrapped in gauze, intact and patent. Patient undressed into gown. Denies cp, sob, n/v/d, or abdominal pain at this time. Stretcher in lowest position, wheels locked, appropriate side rails in place, call bell in reach.

## 2023-01-27 NOTE — ED PROVIDER NOTE - NS ED ROS FT
General: denies fever, chills  CV: denies chest pain, palpitations  Resp: denies difficulty breathing, cough  Abdominal: epigastric pain. denies nausea, vomiting, diarrhea,  : denies urinary pain or discharge  Skin: denies rashes, bruises

## 2023-01-27 NOTE — H&P ADULT - NSHPLABSRESULTS_GEN_ALL_CORE
< from: CT Abdomen and Pelvis w/ IV Cont (01.26.23 @ 13:58) >      LIVER: Within normal limits.  BILE DUCTS: Normal caliber.  GALLBLADDER: Cholelithiasis. Nonspecific diffuse gallbladder wall   thickening.  SPLEEN: Within normal limits.  PANCREAS: Moderate, loculated peripancreatic fluid collection which   extends into the splenic hilum, along the mesenteric root, and along the   SMV branches extending to the right lower quadrant, overall measuring   approximately 18.7 x 18.4 cm in extent. Normal pancreatic parenchymal   enhancement. No pancreatic ductal dilatation. Scattered pancreatic   parenchymal calcifications, likely sequela of chronic.  ADRENALS: Within normal limits.  KIDNEYS/URETERS: Bilateral renal cysts measuring up to 4.9 cm and the   right kidney. No hydronephrosis    BLADDER: Mild diffuse bladder wall thickening and trabeculation.  REPRODUCTIVE ORGANS: Prostate is enlarged.    BOWEL: No bowel obstruction. Appendix is normal. Colonic diverticulosis   without evidence of acute diverticulitis.  PERITONEUM: Trace pelvic free fluid.  VESSELS: Atherosclerotic changes.  RETROPERITONEUM/LYMPH NODES: No lymphadenopathy.  ABDOMINAL WALL: Within normal limits.  BONES: Degenerative changes.    IMPRESSION:  18.7 cm loculated yessica-pancreatic collection, which extends into the   splenic hilum, along the mesenteric root and along branches of the SMV   extending to the right lower quadrant. Findings likely secondary to   recent acute interstitial pancreatitis, with no findings to suggest   pancreatic or peripancreatic necrosis.    Cholelithiasis. Nonspecific diffuse gallbladder wall thickening. If there   is concern for acute cholecystitis, recommend right upper quadrant   ultrasound.    Partially visualized, ascending aortic aneurysm measuring up to 5.8 cm in   diameter.          < end of copied text >

## 2023-01-27 NOTE — ED ADULT NURSE NOTE - CHIEF COMPLAINT QUOTE
Pt transfer from [place], c/o generalized weakness, fatigue, loss of appetite and RLQ abd pain, per imaging found to have pancreatitis and ascending aortic aneurysm, here for surgical consult. Arrived w/ 20G IV on left forearm. Denies PMH

## 2023-01-27 NOTE — H&P ADULT - HISTORY OF PRESENT ILLNESS
89 y/o male with no PMHx or PSHx bought in by family for weakness to [facility]. Patient denies any abdominal pain, or weakness. Tolerating diet. Normal BM/flatus. Patient denies fever, chills, nausea, vomiting, constipation, diarrhea, melena, hematochezia, dysuria, hematuria, chest pain, shortness of breath, dizziness, cough. States last meal was a cheese and cm sandwich on Saturday ("his usual sandwich of choice"). ANOX4, however, denies any medical hx, medications at this time and unsure of why he was brought to the hospital, stating "my wife likes hospitals, she probably brought me here",     Denies any hx of pancreatitis. Denies etoh hx, 1/2 pack/day smoker for 10 years as a teenager. Denies any recent weight loss. Tx from [place] for peripancreatic fluid collection. Asking for water through encounter.

## 2023-01-27 NOTE — PATIENT PROFILE ADULT - FALL HARM RISK - HARM RISK INTERVENTIONS
Assistance with ambulation/Assistance OOB with selected safe patient handling equipment/Communicate Risk of Fall with Harm to all staff/Discuss with provider need for PT consult/Monitor for mental status changes/Monitor gait and stability/Move patient closer to nurses' station/Provide patient with walking aids - walker, cane, crutches/Reinforce activity limits and safety measures with patient and family/Reorient to person, place and time as needed/Tailored Fall Risk Interventions/Toileting schedule using arm’s reach rule for commode and bathroom/Use of alarms - bed, chair and/or voice tab/Visual Cue: Yellow wristband and red socks/Bed in lowest position, wheels locked, appropriate side rails in place/Call bell, personal items and telephone in reach/Instruct patient to call for assistance before getting out of bed or chair/Non-slip footwear when patient is out of bed/[place] to call system/Physically safe environment - no spills, clutter or unnecessary equipment/Purposeful Proactive Rounding/Room/bathroom lighting operational, light cord in reach

## 2023-01-27 NOTE — ED ADULT NURSE REASSESSMENT NOTE - NS ED NURSE REASSESS COMMENT FT1
Patient trying to climb out of bed, agitated, yelling at staff members, states "I need to get the hell out of here, I don't need to be here, don't you look at me". MD Arreola at bedside. Staff members unable to desculate situation, patient medicated per orders, patient placed on constant observation for safety. Vitals remain stable. Belongings collected and placed in closet across 21. Valuable with security. Patient trying to climb out of bed, agitated, yelling at staff members, states "I need to get the hell out of here, I don't need to be here, don't you look at me". MD Arreola at bedside. Staff members unable to de-escalate situation, patient medicated per orders, patient placed on constant observation for safety. Vitals remain stable. Belongings collected and placed in closet across 21. Valuable with security. Patient has glasses on, OK for patient to keep as per MD Arreola.

## 2023-01-27 NOTE — ED PROVIDER NOTE - ATTENDING CONTRIBUTION TO CARE
88-year-old male with no reported past medical or surgical history, presenting to [facility] ER as transfer from [facility] for surgical consultation for a loculated peripancreatic fluid collection measuring 18 cm in size, as well as a abdominal aortic aneurysm measuring 5.8 cm in size found on CT imaging at [facility].  Patient initially presented to [facility] ED for generalized weakness, right-sided abdominal pain and decreased p.o. intake.  Patient given IV Zosyn, IV fluids, and IV potassium for hypokalemia.    Exam  Abdomen soft, nontender, nondistended    Assessment/plan  CT findings as above with peripancreatic loculated fluid collection, AAA measuring 5.8 cm  Surgery consultation and admission planned

## 2023-01-27 NOTE — ED PROVIDER NOTE - PROGRESS NOTE DETAILS
megha; consulted surgery regarding loculated peripancreatic collection, will see pt at bedside. megha; re consulted vascular surgery. pt agitated and trying to get out of bed, not able to be redirected, required 2.5 haldol. megha; consulted surgery regarding loculated peripancreatic collection, will see pt at bedside. consulted vascular surgery regarding aortic aneurysm. pt stable.

## 2023-01-27 NOTE — ED ADULT NURSE NOTE - NSIMPLEMENTINTERV_GEN_ALL_ED
Implemented All Fall with Harm Risk Interventions:  [place] to call system. Call bell, personal items and telephone within reach. Instruct patient to call for assistance. Room bathroom lighting operational. Non-slip footwear when patient is off stretcher. Physically safe environment: no spills, clutter or unnecessary equipment. Stretcher in lowest position, wheels locked, appropriate side rails in place. Provide visual cue, wrist band, yellow gown, etc. Monitor gait and stability. Monitor for mental status changes and reorient to person, place, and time. Review medications for side effects contributing to fall risk. Reinforce activity limits and safety measures with patient and family. Provide visual clues: red socks.

## 2023-01-27 NOTE — ED PROVIDER NOTE - PHYSICAL EXAMINATION
GENERAL: well appearing in no acute distress, non-toxic appearing  CARDIAC: regular rate and rhythm, normal S1S2, no appreciable murmurs,  PULM: normal breath sounds, clear to ascultation bilaterally, no rales, rhonchi, wheezing  GI: abdomen nondistended, soft, tender epigastric, no guarding, rebound tenderness  : no CVA tenderness b/l, no suprapubic tenderness  MSK: no peripheral edema, no calf tenderness b/l  SKIN: well-perfused, extremities warm, no visible rashes  PSYCH: appropriate mood and affect GENERAL: well appearing in no acute distress, non-toxic appearing  CARDIAC: regular rate and rhythm, normal S1S2, no appreciable murmurs,  PULM: normal breath sounds, clear to ascultation bilaterally, no rales, rhonchi, wheezing  GI: abdomen nondistended, soft, nt, no guarding, no rebound tenderness  : no CVA tenderness b/l, no suprapubic tenderness  MSK: no peripheral edema, no calf tenderness b/l  SKIN: well-perfused, extremities warm, no visible rashes  PSYCH: appropriate mood and affect

## 2023-01-28 ENCOUNTER — TRANSCRIPTION ENCOUNTER (OUTPATIENT)
Age: 88
End: 2023-01-28

## 2023-01-28 VITALS
SYSTOLIC BLOOD PRESSURE: 147 MMHG | RESPIRATION RATE: 18 BRPM | OXYGEN SATURATION: 98 % | DIASTOLIC BLOOD PRESSURE: 82 MMHG | TEMPERATURE: 99 F | HEART RATE: 90 BPM

## 2023-01-28 LAB
ANION GAP SERPL CALC-SCNC: 10 MMOL/L — SIGNIFICANT CHANGE UP (ref 7–14)
BUN SERPL-MCNC: 5 MG/DL — LOW (ref 7–23)
CALCIUM SERPL-MCNC: 9 MG/DL — SIGNIFICANT CHANGE UP (ref 8.4–10.5)
CHLORIDE SERPL-SCNC: 98 MMOL/L — SIGNIFICANT CHANGE UP (ref 98–107)
CO2 SERPL-SCNC: 26 MMOL/L — SIGNIFICANT CHANGE UP (ref 22–31)
CREAT SERPL-MCNC: 0.54 MG/DL — SIGNIFICANT CHANGE UP (ref 0.5–1.3)
EGFR: 97 ML/MIN/1.73M2 — SIGNIFICANT CHANGE UP
GLUCOSE SERPL-MCNC: 91 MG/DL — SIGNIFICANT CHANGE UP (ref 70–99)
HCT VFR BLD CALC: 38.6 % — LOW (ref 39–50)
HGB BLD-MCNC: 12.7 G/DL — LOW (ref 13–17)
MAGNESIUM SERPL-MCNC: 1.9 MG/DL — SIGNIFICANT CHANGE UP (ref 1.6–2.6)
MCHC RBC-ENTMCNC: 32.5 PG — SIGNIFICANT CHANGE UP (ref 27–34)
MCHC RBC-ENTMCNC: 32.9 GM/DL — SIGNIFICANT CHANGE UP (ref 32–36)
MCV RBC AUTO: 98.7 FL — SIGNIFICANT CHANGE UP (ref 80–100)
NRBC # BLD: 0 /100 WBCS — SIGNIFICANT CHANGE UP (ref 0–0)
NRBC # FLD: 0 K/UL — SIGNIFICANT CHANGE UP (ref 0–0)
PHOSPHATE SERPL-MCNC: 3.5 MG/DL — SIGNIFICANT CHANGE UP (ref 2.5–4.5)
PLATELET # BLD AUTO: 486 K/UL — HIGH (ref 150–400)
POTASSIUM SERPL-MCNC: 3.6 MMOL/L — SIGNIFICANT CHANGE UP (ref 3.5–5.3)
POTASSIUM SERPL-SCNC: 3.6 MMOL/L — SIGNIFICANT CHANGE UP (ref 3.5–5.3)
RBC # BLD: 3.91 M/UL — LOW (ref 4.2–5.8)
RBC # FLD: 14 % — SIGNIFICANT CHANGE UP (ref 10.3–14.5)
SODIUM SERPL-SCNC: 134 MMOL/L — LOW (ref 135–145)
WBC # BLD: 13.54 K/UL — HIGH (ref 3.8–10.5)
WBC # FLD AUTO: 13.54 K/UL — HIGH (ref 3.8–10.5)

## 2023-01-28 RX ORDER — SODIUM CHLORIDE 9 MG/ML
1000 INJECTION, SOLUTION INTRAVENOUS
Refills: 0 | Status: DISCONTINUED | OUTPATIENT
Start: 2023-01-28 | End: 2023-01-28

## 2023-01-28 RX ORDER — SODIUM CHLORIDE 9 MG/ML
1 INJECTION INTRAMUSCULAR; INTRAVENOUS; SUBCUTANEOUS
Refills: 0 | Status: DISCONTINUED | OUTPATIENT
Start: 2023-01-28 | End: 2023-01-28

## 2023-01-28 RX ADMIN — HEPARIN SODIUM 5000 UNIT(S): 5000 INJECTION INTRAVENOUS; SUBCUTANEOUS at 05:34

## 2023-01-28 NOTE — DISCHARGE NOTE PROVIDER - HOSPITAL COURSE
88M w/no previous medical or surgical history was bought in by family for weakness to [facility]. CT scan demonstrated a 18x18 cm peripancreatic fluid collection extending into splenic hilum, along the SMV branches with scattered pancreatic parenchymal calcification suggesting an acute on chronic pancreatitis picture, likely gallstone pancreatitis. The patient was transferred to [facility] for potential surgical intervention, but demonstrated near complete resolve of abdominal symptoms. He is now tolerating a regular diet without pain.     Of note, a 4.9cm right renal cyst and a 5.8cm ascending aortic aneurysm were incidentally noted on CT scan that will require outpatient follow-up but are unrelated to this admission.    Patient is hemodynamically stable with well-controlled pain, stable for discharge at this time.

## 2023-01-28 NOTE — DISCHARGE NOTE PROVIDER - NSDCFUADDAPPT_GEN_ALL_CORE_FT
You must see both Dr. Pagan (for pancreatitis) and Dr. Mcarthur (ascending aortic aneurysm) upon discharge as instructed.

## 2023-01-28 NOTE — PROGRESS NOTE ADULT - SUBJECTIVE AND OBJECTIVE BOX
B TEAM Surgery Progress Note    SUBJECTIVE: Patient seen and examined at bedside with surgical team. Patient without abdominal pain. Denies fever, chills, CP, SOB, nausea, vomiting. Tolerating regular diet. Patient unaware he is in the hospital but remains conversational/directed.    OBJECTIVE:  Vital Signs Last 24 Hrs  T(C): 36.8 (28 Jan 2023 09:30), Max: 37.2 (27 Jan 2023 11:58)  T(F): 98.3 (28 Jan 2023 09:30), Max: 98.9 (27 Jan 2023 11:58)  HR: 84 (28 Jan 2023 09:30) (71 - 84)  BP: 151/89 (28 Jan 2023 09:30) (121/76 - 151/89)  RR: 18 (28 Jan 2023 09:30) (18 - 18)  SpO2: 95% (28 Jan 2023 09:30) (95% - 98%)  Parameters below as of 28 Jan 2023 09:30  Patient On (Oxygen Delivery Method): room air    I&O's Summary  27 Jan 2023 07:01  -  28 Jan 2023 07:00  --------------------------------------------------------  IN: 1000 mL / OUT: 350 mL / NET: 650 mL    PHYSICAL EXAM:  GENERAL: NAD, well-groomed, well-developed  HEAD:  Atraumatic, Normocephalic  EYES: EOMI, PERRLA, conjunctiva and sclera clear  ENMT: No tonsillar erythema, exudates, or enlargement; Moist mucous membranes  NECK: Supple, No JVD, Normal thyroid  HEART: Regular rate and rhythm; No murmurs, rubs, or gallops  RESPIRATORY: CTA B/L, No W/R/R  ABDOMEN: Soft, Nontender, mild discomfort to palpation near periumblical and epigastric region   NEUROLOGY: A&Ox3, nonfocal, moving all extremities  EXTREMITIES:  2+ Peripheral Pulses, No clubbing, cyanosis, or edema  SKIN: warm, dry, normal color, no rash or abnormal lesions    LABS:                      12.7   13.54 )-----------( 486      ( 28 Jan 2023 07:42 )             38.6     01-28    134<L>  |  98  |  5<L>  ----------------------------<  91  3.6   |  26  |  0.54    Ca    9.0      28 Jan 2023 07:42  Phos  3.5     01-28  Mg     1.90     01-28    TPro  6.3  /  Alb  3.0<L>  /  TBili  0.8  /  DBili  x   /  AST  39  /  ALT  25  /  AlkPhos  85  01-27    IMAGING:  ACC: [id number] EXAM: US ABDOMEN COMPLETE ORDERED BY: REYNALDO GARCIA  PROCEDURE DATE: 01/26/2023  INTERPRETATION: CLINICAL INFORMATION: 88-year-old man with abdominal pain  COMPARISON: CT 01/26/2023  TECHNIQUE: Sonography of the abdomen.  FINDINGS:  Liver: Within normal limits.  Bile ducts: Normal caliber.  Gallbladder: Not visualized. (Findings on CT consistent with generalized inflammatory process. May obtain cholescintigraphy as clinically indicated)  Pancreas: Peripancreatic inflammation  Spleen: . Within normal limits.  Right kidney: 12.2 cm. No hydronephrosis.  Left kidney: 12.5 cm. No hydronephrosis. 2.5 cm cyst  Ascites: Partially visualized collection medial to the spleen as demonstrated on prior CT scan.  Aorta and IVC: Visualized portions are within normal limits.  IMPRESSION:  Gallbladder not visualized. Partially visualized peripancreatic inflammation as previously demonstrated.    ACC: [id number] EXAM: CT ABDOMEN AND PELVIS IC ORDERED BY: REYNALDO GARCIA  PROCEDURE DATE: 01/26/2023  INTERPRETATION: CLINICAL INFORMATION: Right lower quadrant abdominal pain.  COMPARISON: None.  CONTRAST/COMPLICATIONS:  IV Contrast: Omnipaque 350 90 cc administered 10 cc discarded  Oral Contrast: NONE  Complications: None reported at time of study completion  PROCEDURE:  CT of the Abdomen and Pelvis was performed.  Sagittal and coronal reformats were performed.  FINDINGS:  LOWER CHEST: Bibasilar atelectasis. Ascending aortic aneurysm measuring 5.8 x 5.8 cm, partially visualized. Coronary artery calcifications.  LIVER: Within normal limits.  BILE DUCTS: Normal caliber.  GALLBLADDER: Cholelithiasis. Nonspecific diffuse gallbladder wall thickening.  SPLEEN: Within normal limits.  PANCREAS: Moderate, loculated peripancreatic fluid collection which extends into the splenic hilum, along the mesenteric root, and along the SMV branches extending to the right lower quadrant, overall measuring approximately 18.7 x 18.4 cm in extent. Normal pancreatic parenchymal enhancement. No pancreatic ductal dilatation. Scattered pancreatic parenchymal calcifications, likely sequela of chronic.  ADRENALS: Within normal limits.  KIDNEYS/URETERS: Bilateral renal cysts measuring up to 4.9 cm and the right kidney. No hydronephrosis  BLADDER: Mild diffuse bladder wall thickening and trabeculation.  REPRODUCTIVE ORGANS: Prostate is enlarged.  BOWEL: No bowel obstruction. Appendix is normal. Colonic diverticulosis without evidence of acute diverticulitis.  PERITONEUM: Trace pelvic free fluid.  VESSELS: Atherosclerotic changes.  RETROPERITONEUM/LYMPH NODES: No lymphadenopathy.  ABDOMINAL WALL: Within normal limits.  BONES: Degenerative changes.  IMPRESSION:  18.7 cm loculated yessica-pancreatic collection, which extends into the splenic hilum, along the mesenteric root and along branches of the SMV extending to the right lower quadrant. Findings likely secondary to recent acute interstitial pancreatitis, with no findings to suggest pancreatic or peripancreatic necrosis. Cholelithiasis. Nonspecific diffuse gallbladder wall thickening. If there is concern for acute cholecystitis, recommend right upper quadrant ultrasound. Partially visualized, ascending aortic aneurysm measuring up to 5.8 cm in diameter.

## 2023-01-28 NOTE — DISCHARGE NOTE PROVIDER - CARE PROVIDER_API CALL
Arnaldo Pagan)  Surgery  [address] [place] [address]  Phone: [phone]  Fax: [phone]  Follow Up Time: 2 weeks    Bartolo Mcarthur)  Surgery; Surgical Critical Care; Thoracic and Cardiac Surgery  [address] [place] [address]  Phone: [phone]  Fax: [phone]  Follow Up Time: 2 weeks

## 2023-01-28 NOTE — DISCHARGE NOTE NURSING/CASE MANAGEMENT/SOCIAL WORK - PATIENT PORTAL LINK FT
You can access the FollowMyHealth Patient Portal offered by [facility] by registering at the following website: [email or web address]. By joining [employer] [facility]’s FollowMyHealth portal, you will also be able to view your health information using other applications (apps) compatible with our system.

## 2023-01-28 NOTE — CHART NOTE - NSCHARTNOTEFT_GEN_A_CORE
Incidental findings are findings on history, physical examination, lab work, and imaging that are not directly related to the patient's chief complaint and cause for hospital admission.     1. The incidental findings for this patient are (please list):   - 4.9cm right renal cyst  - 5.8cm ascending aortic aneurysm     2. Were these findings explained to the patient and/or their family (in the event that either the patient requests communication with their family or the patient is unable to understand or remember the findings and plan)?  - These findings were discussed with both of the patient's sons at bedside on 1/27/23 and 1/28/23, with emphasis on need to follow-up as instructed in the patient's discharge paperwork.    3. Was a copy of the lab work/ imaging report given to the patient and/or their family?  - A copy of the CT scan radiology read has been added to the patient's discharge paperwork.    4. Was the patient asked whether they can follow up with their PMD regarding this finding? If the patient says no, or does not have a PMD, you must identify a provider (i.e. Medicine Clinic or specialist) with whom the patient will follow up.  - The patient was instructed to follow-up with their PCP for the right renal cyst monitoring, and will need to see the cardiothoracic surgeon provided in the discharge paperwork for further evaluation.    5. Was the finding documented on the discharge summary?  - All findings were documented and discussed with the patient/responsible family members. Incidental findings are findings on history, physical examination, lab work, and imaging that are not directly related to the patient's chief complaint and cause for hospital admission.     1. The incidental findings for this patient are (please list):   - 4.9cm right renal cyst  - 5.8cm ascending aortic aneurysm     2. Were these findings explained to the patient and/or their family (in the event that either the patient requests communication with their family or the patient is unable to understand or remember the findings and plan)?  - These findings were discussed with both of the patient's sons at bedside on 1/27/23 and 1/28/23, with emphasis on need to follow-up as instructed in the patient's discharge paperwork.    3. Was a copy of the lab work/ imaging report given to the patient and/or their family?  - A copy of the CT scan radiology read has been added to the patient's discharge paperwork.    4. Was the patient asked whether they can follow up with their PMD regarding this finding? If the patient says no, or does not have a PMD, you must identify a provider (i.e. Medicine Clinic or specialist) with whom the patient will follow up.  - The patient was instructed to follow-up with their PCP for the right renal cyst monitoring, and will need to see the cardiothoracic surgeon provided in the discharge paperwork for further evaluation.    5. Was the finding documented on the discharge summary?  - All findings were documented and discussed with the patient/responsible family members.    B Team Surgery  [id number]

## 2023-01-28 NOTE — DISCHARGE NOTE PROVIDER - NSDCCPCAREPLAN_GEN_ALL_CORE_FT
PRINCIPAL DISCHARGE DIAGNOSIS  Diagnosis: Pancreatitis  Assessment and Plan of Treatment: You were found to have gallstone pancreatitis. Your abdominal pain symptoms were managed conservatively while admitted to the hospital, with resolve of abdominal pain during your stay. You may require your gallbladder taken out in the future. Please schedule an appointment for follow-up, as listed.      SECONDARY DISCHARGE DIAGNOSES  Diagnosis: Ascending aortic aneurysm  Assessment and Plan of Treatment: You were incidentally found to have an ascending aortic aneurysm measuring 5.8cm on CT scan. While you are asymptomatic, it is advised you follow-up with a cardiothoracic surgeon for further evaluation.

## 2023-01-28 NOTE — DISCHARGE NOTE NURSING/CASE MANAGEMENT/SOCIAL WORK - NSDCPEFALRISK_GEN_ALL_CORE
For information on Fall & Injury Prevention, visit: [email or web address] OR  [email or web address] OR  https://www.cdc.gov/steadi/patient.html

## 2023-01-28 NOTE — DISCHARGE NOTE PROVIDER - PROVIDER TOKENS
PROVIDER:[TOKEN:[[id number]],FOLLOWUP:[2 weeks]],PROVIDER:[TOKEN:[[id number]],FOLLOWUP:[2 weeks]]

## 2023-01-28 NOTE — DISCHARGE NOTE PROVIDER - CARE PROVIDERS DIRECT ADDRESSES
[email or web address]

## 2023-01-28 NOTE — DISCHARGE NOTE PROVIDER - NSDCCPTREATMENT_GEN_ALL_CORE_FT
PRINCIPAL PROCEDURE  Procedure: CT abdomen and pelvis  Findings and Treatment: PROCEDURE DATE: 01/26/2023  PROCEDURE: CT of the Abdomen and Pelvis. Sagittal and coronal reformats were performed.  FINDINGS:  LOWER CHEST: Bibasilar atelectasis. Ascending aortic aneurysm measuring 5.8 x 5.8 cm, partially visualized. Coronary artery calcifications.  LIVER: Within normal limits.  BILE DUCTS: Normal caliber.  GALLBLADDER: Cholelithiasis. Nonspecific diffuse gallbladder wall thickening.  SPLEEN: Within normal limits.  PANCREAS: Moderate, loculated peripancreatic fluid collection which extends into the splenic hilum, along the mesenteric root, and along the SMV branches extending to the right lower quadrant, overall measuring approximately 18.7 x 18.4 cm in extent. Normal pancreatic parenchymal enhancement. No pancreatic ductal dilatation. Scattered pancreatic parenchymal calcifications, likely sequela of chronic.  ADRENALS: Within normal limits.  KIDNEYS/URETERS: Bilateral renal cysts measuring up to 4.9 cm and the right kidney. No hydronephrosis  BLADDER: Mild diffuse bladder wall thickening and trabeculation.  REPRODUCTIVE ORGANS: Prostate is enlarged.  BOWEL: No bowel obstruction. Appendix is normal. Colonic diverticulosis without evidence of acute diverticulitis.  PERITONEUM: Trace pelvic free fluid.  VESSELS: Atherosclerotic changes.  RETROPERITONEUM/LYMPH NODES: No lymphadenopathy.  ABDOMINAL WALL: Within normal limits.  BONES: Degenerative changes.  IMPRESSION: 18.7cm loculated yessica-pancreatic collection, which extends into the splenic hilum, along the mesenteric root and along branches of the SMV extending to the right lower quadrant. Findings likely secondary to recent acute interstitial pancreatitis, with no findings to suggest pancreatic or peripancreatic necrosis. Cholelithiasis. Nonspecific diffuse gallbladder wall thickening. If there is concern for acute cholecystitis, recommend right upper quadrant ultrasound. Partially visualized, ascending aortic aneurysm measuring up to 5.8 cm in diameter.

## 2023-01-28 NOTE — PROGRESS NOTE ADULT - ASSESSMENT
88M w/no PMHx or PSHx bought in by family for weakness to [facility]. CT with 18x18 cm peripancreatic fluid collection extending into splenic hilum, along SMV branches, with scattered pancreatic parenchymal calcification suggesting an acute on chronic pancreatitis picture, however patient denies hx of pancreatitis or pain at this time. Also noted 4.9 cm R renal cyst and 5.8 cm ascending aortic aneurysm and gallstones on scan. Denies EtOH hx. Abdominal pain completely resolved. Tolerating regular diet.    Plan:  - no acute surgical intervention at this time  - peripancreatic fluid collection suggestive of a recent acute interstitial pancreatitis, timeline unclear  - CTS consultation for incidental finding ascending aortic aneurysm  - Regular diet  - d/c IVF  - pain control PRN  - c/w tele    B Team Surgery  [id number]

## 2023-01-30 PROBLEM — Z00.00 ENCOUNTER FOR PREVENTIVE HEALTH EXAMINATION: Status: ACTIVE | Noted: 2023-01-30

## 2023-01-31 LAB
CULTURE RESULTS: SIGNIFICANT CHANGE UP
CULTURE RESULTS: SIGNIFICANT CHANGE UP
SPECIMEN SOURCE: SIGNIFICANT CHANGE UP
SPECIMEN SOURCE: SIGNIFICANT CHANGE UP

## 2023-02-07 ENCOUNTER — APPOINTMENT (OUTPATIENT)
Dept: SURGERY | Facility: CLINIC | Age: 88
End: 2023-02-07
Payer: MEDICARE

## 2023-02-07 VITALS
OXYGEN SATURATION: 99 % | RESPIRATION RATE: 17 BRPM | WEIGHT: 171 LBS | HEART RATE: 71 BPM | HEIGHT: 70 IN | BODY MASS INDEX: 24.48 KG/M2 | DIASTOLIC BLOOD PRESSURE: 80 MMHG | SYSTOLIC BLOOD PRESSURE: 139 MMHG

## 2023-02-07 PROCEDURE — 99203 OFFICE O/P NEW LOW 30 MIN: CPT

## 2023-02-27 ENCOUNTER — APPOINTMENT (OUTPATIENT)
Dept: MRI IMAGING | Facility: CLINIC | Age: 88
End: 2023-02-27
Payer: MEDICARE

## 2023-02-27 ENCOUNTER — OUTPATIENT (OUTPATIENT)
Dept: OUTPATIENT SERVICES | Facility: HOSPITAL | Age: 88
LOS: 1 days | End: 2023-02-27
Payer: MEDICARE

## 2023-02-27 DIAGNOSIS — K86.89 OTHER SPECIFIED DISEASES OF PANCREAS: ICD-10-CM

## 2023-02-27 PROCEDURE — A9585: CPT

## 2023-02-27 PROCEDURE — 74183 MRI ABD W/O CNTR FLWD CNTR: CPT

## 2023-02-27 PROCEDURE — 74183 MRI ABD W/O CNTR FLWD CNTR: CPT | Mod: 26,MH

## 2023-03-09 ENCOUNTER — APPOINTMENT (OUTPATIENT)
Dept: CARDIOTHORACIC SURGERY | Facility: CLINIC | Age: 88
End: 2023-03-09
Payer: MEDICARE

## 2023-03-09 VITALS
OXYGEN SATURATION: 98 % | SYSTOLIC BLOOD PRESSURE: 179 MMHG | WEIGHT: 170 LBS | RESPIRATION RATE: 16 BRPM | HEART RATE: 76 BPM | BODY MASS INDEX: 24.34 KG/M2 | DIASTOLIC BLOOD PRESSURE: 83 MMHG | TEMPERATURE: 98 F | HEIGHT: 70 IN

## 2023-03-09 DIAGNOSIS — Z98.890 OTHER SPECIFIED POSTPROCEDURAL STATES: ICD-10-CM

## 2023-03-09 DIAGNOSIS — Z80.9 FAMILY HISTORY OF MALIGNANT NEOPLASM, UNSPECIFIED: ICD-10-CM

## 2023-03-09 DIAGNOSIS — K86.2 CYST OF PANCREAS: ICD-10-CM

## 2023-03-09 DIAGNOSIS — R53.1 WEAKNESS: ICD-10-CM

## 2023-03-09 DIAGNOSIS — I71.21 ANEURYSM OF THE ASCENDING AORTA, WITHOUT RUPTURE: ICD-10-CM

## 2023-03-09 PROCEDURE — 99213 OFFICE O/P EST LOW 20 MIN: CPT

## 2023-03-09 RX ORDER — METOPROLOL TARTRATE 25 MG/1
25 TABLET, FILM COATED ORAL TWICE DAILY
Qty: 60 | Refills: 3 | Status: ACTIVE | COMMUNITY
Start: 2023-03-09

## 2023-03-09 RX ORDER — LOSARTAN POTASSIUM 100 MG/1
TABLET, FILM COATED ORAL DAILY
Refills: 0 | Status: ACTIVE | COMMUNITY

## 2023-03-09 NOTE — ASSESSMENT
[FreeTextEntry1] : Rian is an 88 year old male with no significant PMH or PSH. Of note he was taken by his family to [facility] due to weakness and found to have a peripancreatic fluid collection on imaging. An incidental finding of ascending aortic aneurysm of 5.8 cm. He presents today with his two sons for surgical consultation. \par \par RIAN ZUNIGA denies chest pain, shortness of breath, dyspnea on exertion, palpitations, orthopnea, paroxysmal nocturnal dyspnea, claudication, presyncopal, or syncopal symptoms. \par \par I have reviewed the patient's medical records, diagnostic images during the time of this office consultation and have made the following recommendation. \par \par Review of the imaging shows ascending aortic aneurysm that would require surgical intervention. \par \par Risks, benefits and alternatives to surgery discussed. Risks included but not limited to bleeding, risk of stroke, myocardial Infarction, kidney problems, blood transfusion, permanent pacemaker implantation, infections and death. Operative mortality and complication risks are low. \par \par Various approaches discussed in detail. \par \par Patient is anxious today, and is visibly wanting to leave the exam room. He lives with his wife. \par \par The patient is a candidate for aneurysm aortic replacement.  Will stagger testing and have patient return to our office after CTA of chest. He is seeing Dr. Pagan for follow up early next week. \par \par All questions have been fully answered and concerns addressed. \par

## 2023-03-09 NOTE — HISTORY OF PRESENT ILLNESS
[FreeTextEntry1] : Rian is an 88 year old male former smoker with no significant PMH or PSH. Of note he was taken by his family to [facility] due to weakness and found to have a peripancreatic fluid collection on imaging. An incidental finding of ascending aortic aneurysm of 5.8 cm. He presents today with his two sons for surgical consultation. He lives with his wife and independent in all his ADLs. \par \par RIAN ZUNIGA denies chest pain, shortness of breath, dyspnea on exertion, palpitations, orthopnea, paroxysmal nocturnal dyspnea, claudication, presyncopal, or syncopal symptoms. \par \par \par

## 2023-03-09 NOTE — REVIEW OF SYSTEMS
[Feeling Tired] : feeling tired [Eye Pain] : no eye pain [Chest Pain] : no chest pain [Shortness Of Breath] : no shortness of breath [Abdominal Pain] : no abdominal pain [Constipation] : no constipation [Dysuria] : no dysuria

## 2023-03-09 NOTE — PHYSICAL EXAM
[General Appearance - Alert] : alert [Sclera] : the sclera and conjunctiva were normal [Jugular Venous Distention Increased] : there was no jugular-venous distention [Respiration, Rhythm And Depth] : normal respiratory rhythm and effort [Auscultation Breath Sounds / Voice Sounds] : lungs were clear to auscultation bilaterally [Heart Rate And Rhythm] : heart rate was normal and rhythm regular [Examination Of The Chest] : the chest was normal in appearance [Bowel Sounds] : normal bowel sounds [No CVA Tenderness] : no ~M costovertebral angle tenderness [Oriented To Time, Place, And Person] : oriented to person, place, and time

## 2023-03-14 ENCOUNTER — APPOINTMENT (OUTPATIENT)
Dept: SURGERY | Facility: CLINIC | Age: 88
End: 2023-03-14
Payer: MEDICARE

## 2023-03-14 VITALS
HEIGHT: 70 IN | WEIGHT: 172 LBS | DIASTOLIC BLOOD PRESSURE: 78 MMHG | HEART RATE: 67 BPM | BODY MASS INDEX: 24.62 KG/M2 | OXYGEN SATURATION: 97 % | SYSTOLIC BLOOD PRESSURE: 124 MMHG | RESPIRATION RATE: 16 BRPM

## 2023-03-14 DIAGNOSIS — K86.89 OTHER SPECIFIED DISEASES OF PANCREAS: ICD-10-CM

## 2023-03-14 DIAGNOSIS — K85.10 BILIARY ACUTE PANCREATITIS WITHOUT NECROSIS OR INFECTION: ICD-10-CM

## 2023-03-14 PROCEDURE — 99213 OFFICE O/P EST LOW 20 MIN: CPT

## 2023-03-16 PROBLEM — K85.10 PANCREATITIS, GALLSTONE: Status: ACTIVE | Noted: 2023-02-10

## 2023-03-16 PROBLEM — K86.89 FLUID COLLECTION OF PANCREAS: Status: ACTIVE | Noted: 2023-02-07

## 2023-03-16 NOTE — REASON FOR VISIT
[Initial Consultation] : an initial consultation for [Family Member] : family member [FreeTextEntry2] : pancreatitis

## 2023-03-16 NOTE — PHYSICAL EXAM
[Normal] : oriented to person, place and time, with appropriate affect [de-identified] : anicteric [de-identified] : supple [de-identified] : normal respiratory effort  [de-identified] : no deformities appreciated [de-identified] : normal appearance

## 2023-03-16 NOTE — ASSESSMENT
[FreeTextEntry1] : Mr. ADAM ZUNIGA is a 88 year old male who recently presented with generalized weakness to [facility], and found to have a large peripancreatic fluid collection measuring 18 cm. Lipase was normal on arrival, amylase not drawn.  Noted to have gallstones, no acute cholecystitis. Imaging also shows ascending aortic aneurysm measuring up to 5.8 cm.   Patient is without any symptoms, denies pain.  Likely had acute pancreatitis 2/2 gallstones but delayed going the hospital.   \par \par -Repeat imaging with MRCP in 4 weeks \par -RTC after\par \par I personally reviewed patient's imaging and radiology reports. Findings and recommendations discussed with patient and sons and all questions answered at the time of visit and they verbalized understanding.

## 2023-03-16 NOTE — HISTORY OF PRESENT ILLNESS
[de-identified] : Mr. ADAM ZUNIGA is a 88 year old male who presents for initial consultation.  No known PMH or PSH. He was brought in by family for weakness to [facility], and found to have a peripancreatic fluid collection on imaging, transferred to [facility] on 1/27/23. Labs showed normal lipase level of 138 (ref range ). CT a/p 1/26/23 noting a 18.7 cm loculated yessica-pancreatic collection, which extends into the splenic hilum, along the mesenteric root and along branches of the SMV extending to the right lower quadrant. Findings likely secondary to recent acute interstitial pancreatitis, with no findings to suggest pancreatic or peripancreatic necrosis. Cholelithiasis. Nonspecific diffuse gallbladder wall thickening.Partially visualized, ascending aortic aneurysm measuring up to 5.8 cm in diameter.\par \par Patient denies abdominal pain prior to admission and currently.  He is eating less than usual over the last few weeks with a few lbs weight loss.  Denies nausea, vomiting fevers, or changes in bowel habits.  Denies any hx of pancreatitis or ETOH abuse. Never had issues with his gall bladder in the past He presents with his sons to discuss findings on CT and further recommendations.  \par \par \par

## 2023-03-17 NOTE — PHYSICAL EXAM
[Normal] : oriented to person, place and time, with appropriate affect [de-identified] : anicteric [de-identified] : supple [de-identified] : normal respiratory effort  [de-identified] : no deformities appreciated [de-identified] : normal appearance

## 2023-03-17 NOTE — ASSESSMENT
[FreeTextEntry1] : Mr. ADAM ZUNIGA is a 88 year old male who presented with generalized weakness to [facility], and found to have a large peripancreatic fluid collection measuring 18 cm on CT 1/26/23. Lipase was normal on arrival, amylase not drawn.  Noted to have gallstones, no acute cholecystitis. Imaging also shows ascending aortic aneurysm measuring up to 5.8 cm.   Recent MRI with stable findings compared to CT.  Patient is without any symptoms, denies pain.  Likely had acute pancreatitis 2/2 gallstones but delayed going the hospital.   \par \par -Repeat imaging with MRCP in 4 weeks \par -RTC after\par \par I personally reviewed patient's imaging and radiology reports. Findings and recommendations discussed with patient and sons and all questions answered at the time of visit and they verbalized understanding.

## 2023-03-17 NOTE — REASON FOR VISIT
[Follow-Up Visit] : a follow-up visit for [Family Member] : family member [FreeTextEntry2] : pancreatitis

## 2023-03-17 NOTE — HISTORY OF PRESENT ILLNESS
[de-identified] : Mr. ADAM ZUNIGA is a 88 year old male who presents for initial consultation.  No known PMH or PSH. He was brought in by family for weakness to [facility], and found to have a peripancreatic fluid collection on imaging, transferred to [facility] on 1/27/23. Labs showed normal lipase level of 138 (ref range ). CT a/p 1/26/23 noting a 18.7 cm loculated yessica-pancreatic collection, which extends into the splenic hilum, along the mesenteric root and along branches of the SMV extending to the right lower quadrant. Findings likely secondary to recent acute interstitial pancreatitis, with no findings to suggest pancreatic or peripancreatic necrosis. Cholelithiasis. Nonspecific diffuse gallbladder wall thickening.Partially visualized, ascending aortic aneurysm measuring up to 5.8 cm in diameter.\par \par Patient denies abdominal pain at time of presentation to [facility] and currently has no pain. Reports eating well and has no symptoms.  Denies any prior hx of pancreatitis or ETOH abuse. No issues with gall bladder in the past.   He presents after recent MRCP 2/27/23 showing pancreas is diffusely thickened and heterogeneous and surrounded by high density material similar radiographic appearance to recent CT scan with infiltration into the right aspect of the mesentery; The areas measure approximately 15 cm in craniocaudal dimension not significantly changed.  \par \par \par \par

## 2023-04-03 ENCOUNTER — APPOINTMENT (OUTPATIENT)
Dept: CARDIOTHORACIC SURGERY | Facility: CLINIC | Age: 88
End: 2023-04-03

## 2023-04-15 ENCOUNTER — INPATIENT (INPATIENT)
Facility: HOSPITAL | Age: 88
LOS: 1 days | Discharge: HOME | End: 2023-04-17
Attending: HOSPITALIST | Admitting: HOSPITALIST
Payer: MEDICARE

## 2023-04-15 VITALS
WEIGHT: 169.98 LBS | OXYGEN SATURATION: 98 % | TEMPERATURE: 97 F | DIASTOLIC BLOOD PRESSURE: 85 MMHG | RESPIRATION RATE: 18 BRPM | HEART RATE: 68 BPM | SYSTOLIC BLOOD PRESSURE: 137 MMHG

## 2023-04-15 LAB
ALBUMIN SERPL ELPH-MCNC: 3.5 G/DL — SIGNIFICANT CHANGE UP (ref 3.3–5)
ALP SERPL-CCNC: 73 U/L — SIGNIFICANT CHANGE UP (ref 40–120)
ALT FLD-CCNC: 13 U/L — SIGNIFICANT CHANGE UP (ref 12–78)
ANION GAP SERPL CALC-SCNC: 5 MMOL/L — SIGNIFICANT CHANGE UP (ref 5–17)
APTT BLD: 28 SEC — SIGNIFICANT CHANGE UP (ref 27.5–35.5)
AST SERPL-CCNC: 15 U/L — SIGNIFICANT CHANGE UP (ref 15–37)
BASOPHILS # BLD AUTO: 0.04 K/UL — SIGNIFICANT CHANGE UP (ref 0–0.2)
BASOPHILS NFR BLD AUTO: 0.5 % — SIGNIFICANT CHANGE UP (ref 0–2)
BILIRUB SERPL-MCNC: 0.6 MG/DL — SIGNIFICANT CHANGE UP (ref 0.2–1.2)
BUN SERPL-MCNC: 10 MG/DL — SIGNIFICANT CHANGE UP (ref 7–23)
CALCIUM SERPL-MCNC: 9 MG/DL — SIGNIFICANT CHANGE UP (ref 8.5–10.1)
CHLORIDE SERPL-SCNC: 108 MMOL/L — SIGNIFICANT CHANGE UP (ref 96–108)
CO2 SERPL-SCNC: 28 MMOL/L — SIGNIFICANT CHANGE UP (ref 22–31)
CREAT SERPL-MCNC: 0.78 MG/DL — SIGNIFICANT CHANGE UP (ref 0.5–1.3)
EGFR: 87 ML/MIN/1.73M2 — SIGNIFICANT CHANGE UP
EOSINOPHIL # BLD AUTO: 0.13 K/UL — SIGNIFICANT CHANGE UP (ref 0–0.5)
EOSINOPHIL NFR BLD AUTO: 1.7 % — SIGNIFICANT CHANGE UP (ref 0–6)
GLUCOSE SERPL-MCNC: 95 MG/DL — SIGNIFICANT CHANGE UP (ref 70–99)
HCT VFR BLD CALC: 48.5 % — SIGNIFICANT CHANGE UP (ref 39–50)
HGB BLD-MCNC: 15.6 G/DL — SIGNIFICANT CHANGE UP (ref 13–17)
IMM GRANULOCYTES NFR BLD AUTO: 0.3 % — SIGNIFICANT CHANGE UP (ref 0–0.9)
INR BLD: 1.11 RATIO — SIGNIFICANT CHANGE UP (ref 0.88–1.16)
LYMPHOCYTES # BLD AUTO: 3.12 K/UL — SIGNIFICANT CHANGE UP (ref 1–3.3)
LYMPHOCYTES # BLD AUTO: 40.9 % — SIGNIFICANT CHANGE UP (ref 13–44)
MCHC RBC-ENTMCNC: 32.2 G/DL — SIGNIFICANT CHANGE UP (ref 32–36)
MCHC RBC-ENTMCNC: 33.1 PG — SIGNIFICANT CHANGE UP (ref 27–34)
MCV RBC AUTO: 103 FL — HIGH (ref 80–100)
MONOCYTES # BLD AUTO: 0.45 K/UL — SIGNIFICANT CHANGE UP (ref 0–0.9)
MONOCYTES NFR BLD AUTO: 5.9 % — SIGNIFICANT CHANGE UP (ref 2–14)
NEUTROPHILS # BLD AUTO: 3.87 K/UL — SIGNIFICANT CHANGE UP (ref 1.8–7.4)
NEUTROPHILS NFR BLD AUTO: 50.7 % — SIGNIFICANT CHANGE UP (ref 43–77)
NRBC # BLD: 0 /100 WBCS — SIGNIFICANT CHANGE UP (ref 0–0)
PLATELET # BLD AUTO: 278 K/UL — SIGNIFICANT CHANGE UP (ref 150–400)
POTASSIUM SERPL-MCNC: 4 MMOL/L — SIGNIFICANT CHANGE UP (ref 3.5–5.3)
POTASSIUM SERPL-SCNC: 4 MMOL/L — SIGNIFICANT CHANGE UP (ref 3.5–5.3)
PROT SERPL-MCNC: 7.4 GM/DL — SIGNIFICANT CHANGE UP (ref 6–8.3)
PROTHROM AB SERPL-ACNC: 13.3 SEC — SIGNIFICANT CHANGE UP (ref 10.5–13.4)
RAPID RVP RESULT: SIGNIFICANT CHANGE UP
RBC # BLD: 4.71 M/UL — SIGNIFICANT CHANGE UP (ref 4.2–5.8)
RBC # FLD: 15.3 % — HIGH (ref 10.3–14.5)
SARS-COV-2 RNA SPEC QL NAA+PROBE: SIGNIFICANT CHANGE UP
SODIUM SERPL-SCNC: 141 MMOL/L — SIGNIFICANT CHANGE UP (ref 135–145)
TROPONIN I, HIGH SENSITIVITY RESULT: 4.1 NG/L — SIGNIFICANT CHANGE UP
WBC # BLD: 7.63 K/UL — SIGNIFICANT CHANGE UP (ref 3.8–10.5)
WBC # FLD AUTO: 7.63 K/UL — SIGNIFICANT CHANGE UP (ref 3.8–10.5)

## 2023-04-15 PROCEDURE — 70496 CT ANGIOGRAPHY HEAD: CPT | Mod: 26,MA

## 2023-04-15 PROCEDURE — 0042T: CPT | Mod: MA

## 2023-04-15 PROCEDURE — 70498 CT ANGIOGRAPHY NECK: CPT | Mod: 26,MA

## 2023-04-15 PROCEDURE — 99291 CRITICAL CARE FIRST HOUR: CPT

## 2023-04-15 PROCEDURE — 93010 ELECTROCARDIOGRAM REPORT: CPT

## 2023-04-15 PROCEDURE — 99223 1ST HOSP IP/OBS HIGH 75: CPT

## 2023-04-15 PROCEDURE — 71045 X-RAY EXAM CHEST 1 VIEW: CPT | Mod: 26

## 2023-04-15 RX ORDER — ATORVASTATIN CALCIUM 80 MG/1
40 TABLET, FILM COATED ORAL AT BEDTIME
Refills: 0 | Status: DISCONTINUED | OUTPATIENT
Start: 2023-04-15 | End: 2023-04-17

## 2023-04-15 RX ORDER — ENOXAPARIN SODIUM 100 MG/ML
40 INJECTION SUBCUTANEOUS EVERY 24 HOURS
Refills: 0 | Status: DISCONTINUED | OUTPATIENT
Start: 2023-04-15 | End: 2023-04-17

## 2023-04-15 RX ORDER — ASPIRIN/CALCIUM CARB/MAGNESIUM 324 MG
81 TABLET ORAL DAILY
Refills: 0 | Status: DISCONTINUED | OUTPATIENT
Start: 2023-04-15 | End: 2023-04-17

## 2023-04-15 RX ORDER — SODIUM CHLORIDE 9 MG/ML
1000 INJECTION INTRAMUSCULAR; INTRAVENOUS; SUBCUTANEOUS
Refills: 0 | Status: DISCONTINUED | OUTPATIENT
Start: 2023-04-15 | End: 2023-04-17

## 2023-04-15 RX ADMIN — ATORVASTATIN CALCIUM 40 MILLIGRAM(S): 80 TABLET, FILM COATED ORAL at 23:09

## 2023-04-15 RX ADMIN — SODIUM CHLORIDE 75 MILLILITER(S): 9 INJECTION INTRAMUSCULAR; INTRAVENOUS; SUBCUTANEOUS at 23:12

## 2023-04-15 NOTE — CONSULT NOTE ADULT - ASSESSMENT
88 year old male with h/o aortic aneurysm with episode of slurred speech and unsteady gait, resolved. PE now non-focal NIHSS 0  CTH, CTA /N no acute findings    Impression: possible TIA/CVa NIHSS 0    Would get MRI brain without contrast  Aspirin for secondary stroke prevention at this time. Atorvastatin , titrate the dose according to LDL.   TTE with bubble study and telemetry   DVT prophylaxis, Neurochecks  Permissive HTN for first 24 hours after the symptom onset followed by gradual normotension.   HbA1C and LDL.   PT/OT/Speech and swallow/safety eval.

## 2023-04-15 NOTE — H&P ADULT - ASSESSMENT
The patient is a 88y Male with significant PMH of aortic aneurysm (currently being followed by cardiology) and pancreatitis presented in ED via EMS with c/o some confusion, change in his mental status, some slurred speech and generalized weakness. He also had unsteady gait and had near syncope at home. He did not fall on the floor as his son was able to catch him prior to him falling down. Patient is not able to call what happened.  Upon arrival in ED, code stroke was alerted. But in ED, he was AAO x4. He has no any slurred speech or any focal motor deficits. He is answering all questions appropriately. His NIH score was 0 in ED.  He denies chest pain, fever, chills, sob, headache, N/V, abdominal pain, diarrhea or dysuria. He says that he does not any medications at home. He denies smoking, alcohol or substance abuse.      # TIA vs Syncope.  Most likely TIA.  Admit to telemetry.  Keep NPO until bedside swallow study done.  Vitals and neuro-checks per protocol.  Lipid panel, A1c level and UA.  MRI brain and 2D Echo in am.  ASA and Statin started,  PT/OT.  Consult neurology in am.    # H/o Ascending aortic aneurysm  CTA shows aneurysm measuring 5.8 x 5.6 cm in diameter.  Follows with cardiology as outpatient.    # DVT prophylaxis: Lovenox sq daily.    Plan of care d/w the patient in detail at bedside, and he verbalized understanding.

## 2023-04-15 NOTE — ED PROVIDER NOTE - OBJECTIVE STATEMENT
88 year old male with h/o aortic aneurysm (currently being followed by cardiology) presents today biba called as a code stroke in triage, as per his son pt was last seen normal at 11am, by 4pm his son came to check in on him and found that he was not himself, pt has slurred speech and was generally weak and nearly fell to the floor, his son was able to catch him prior to him falling down, pt does not recall what happened, he denies chest pain, sob, vomiting, headache, dizziness or visual changes, currently in the ER pts speech has improved as per his son 88 year old male with h/o aortic aneurysm (currently being followed by cardiology) presents today biba called as a code stroke in triage, as per his son pt was last seen normal at 11am, by 4pm his son came to check in on him and found that he was not himself, pt has slurred speech and was generally weak , had unsteady gait and nearly fell to the floor, his son was able to catch him prior to him falling down, pt does not recall what happened, he denies chest pain, sob, vomiting, headache, dizziness or visual changes, currently in the ER pts speech has improved as per his son

## 2023-04-15 NOTE — CONSULT NOTE ADULT - SUBJECTIVE AND OBJECTIVE BOX
Neurology consult    ADAM [id number]     Patient is a 88y old  Male who presents with a chief complaint of     HPI:    88 year old male with h/o aortic aneurysm (currently being followed by cardiology) presents today biba called as a code stroke in triage, as per his son pt was last seen normal at 11am, by 4pm his son came to check in on him and found that he was not himself, pt has slurred speech and was generally weak , had unsteady gait and nearly fell to the floor, his son was able to catch him prior to him falling down, pt does not recall what happened, he denies chest pain, sob, vomiting, headache, dizziness or visual changes, currently in the ER pts speech has improved as per his son  MEDICATIONS    aspirin enteric coated 81 milliGRAM(s) Oral daily  atorvastatin 40 milliGRAM(s) Oral at bedtime  enoxaparin Injectable 40 milliGRAM(s) SubCutaneous every 24 hours  sodium chloride 0.9%. 1000 milliLiter(s) IV Continuous <Continuous>      PMH: No pertinent past medical history         PSH: No significant past surgical history        Family history: No history of dementia, strokes, or seizures   FAMILY HISTORY:      SOCIAL HISTORY:  No history of tobacco or alcohol use     Allergies    No Known Allergies    Intolerances          Weight (kg): 77.1 (04-15 @ 18:10)    Vital Signs Last 24 Hrs  T(C): 36.3 (15 Apr 2023 18:10), Max: 36.3 (15 Apr 2023 18:10)  T(F): 97.3 (15 Apr 2023 18:10), Max: 97.3 (15 Apr 2023 18:10)  HR: 50 (15 Apr 2023 22:03) (50 - 68)  BP: 129/72 (15 Apr 2023 22:03) (122/74 - 137/85)  BP(mean): --  RR: 18 (15 Apr 2023 22:03) (11 - 18)  SpO2: 92% (15 Apr 2023 22:03) (88% - 98%)    Parameters below as of 15 Apr 2023 22:03  Patient On (Oxygen Delivery Method): room air          On Neurological Examination:    Mental Status - Patient is alert, awake, oriented X3. fluent, names, no dysarthria no aphasia Follows commands well and able to answer questions appropriately. Mood and affect  normal    Cranial Nerves - PERRL, EOMI, VFF, V1-V3 intact, no gross facial asymmetry, tongue/uvula midline    Motor Exam -   no drift  Sensory    Intact to light touch and pinprick bilaterally    Coord: FTN intact bilaterally     Gait -  deferred       LABS:  CBC Full  -  ( 15 Apr 2023 18:30 )  WBC Count : 7.63 K/uL  RBC Count : 4.71 M/uL  Hemoglobin : 15.6 g/dL  Hematocrit : 48.5 %  Platelet Count - Automated : 278 K/uL  Mean Cell Volume : 103.0 fl  Mean Cell Hemoglobin : 33.1 pg  Mean Cell Hemoglobin Concentration : 32.2 g/dL  Auto Neutrophil # : 3.87 K/uL  Auto Lymphocyte # : 3.12 K/uL  Auto Monocyte # : 0.45 K/uL  Auto Eosinophil # : 0.13 K/uL  Auto Basophil # : 0.04 K/uL  Auto Neutrophil % : 50.7 %  Auto Lymphocyte % : 40.9 %  Auto Monocyte % : 5.9 %  Auto Eosinophil % : 1.7 %  Auto Basophil % : 0.5 %      04-15    141  |  108  |  10  ----------------------------<  95  4.0   |  28  |  0.78    Ca    9.0      15 Apr 2023 18:30    TPro  7.4  /  Alb  3.5  /  TBili  0.6  /  DBili  x   /  AST  15  /  ALT  13  /  AlkPhos  73  04-15    LIVER FUNCTIONS - ( 15 Apr 2023 18:30 )  Alb: 3.5 g/dL / Pro: 7.4 gm/dL / ALK PHOS: 73 U/L / ALT: 13 U/L / AST: 15 U/L / GGT: x           Hemoglobin A1C:       PT/INR - ( 15 Apr 2023 20:55 )   PT: 13.3 sec;   INR: 1.11 ratio         PTT - ( 15 Apr 2023 20:55 )  PTT:28.0 sec      RADIOLOGY  CTH < from: CT Brain Perfusion Maps Stroke (04.15.23 @ 18:33) >  IMPRESSION:    CTA brain: No hemodynamically significant stenosis    CTA carotid/vertebral artery circulation: There is a marked ascending   aortic aneurysm measuring 5.8 x 5.6 cm in diameter. No hemodynamically   significant stenosis    CT Perfusion: Normal    --- End of Report ---        < end of copied text >

## 2023-04-15 NOTE — H&P ADULT - NSHPLABSRESULTS_GEN_ALL_CORE
LABS:                        15.6   7.63  )-----------( 278      ( 15 Apr 2023 18:30 )             48.5     04-15    141  |  108  |  10  ----------------------------<  95  4.0   |  28  |  0.78    Ca    9.0      15 Apr 2023 18:30    TPro  7.4  /  Alb  3.5  /  TBili  0.6  /  DBili  x   /  AST  15  /  ALT  13  /  AlkPhos  73  04-15    PT/INR - ( 15 Apr 2023 20:55 )   PT: 13.3 sec;   INR: 1.11 ratio         PTT - ( 15 Apr 2023 20:55 )  PTT:28.0 sec      < from: CT Brain Stroke Protocol (04.15.23 @ 18:32) >      IMPRESSION:    No evidence of acute intracranial abnormality.  No evidence of hemorrhage.    Chronic changes as above.      Critical value:  I discussed the finding of this report with Dr. Perdomo at   6:58 PM on 4/15/2023.  Critical value policy of the hospital was   followed.  Read back and confirmation of receipt of this communication   was performed.  This verbal communication supplements the text report of   this document.        --- End of Report ---    < end of copied text >      < from: CT Brain Perfusion Maps Stroke (04.15.23 @ 18:33) >    IMPRESSION:    CTA brain: No hemodynamically significant stenosis    CTA carotid/vertebral artery circulation: There is a marked ascending   aortic aneurysm measuring 5.8 x 5.6 cm in diameter. No hemodynamically   significant stenosis    CT Perfusion: Normal    < end of copied text >

## 2023-04-15 NOTE — ED ADULT NURSE NOTE - CAS EDP DISCH DISPOSITION ADMI
Telephone Encounter by Bozena Mcguire RMA at 05/23/17 09:54 AM     Author:  Bozena Mcguire RMA Service:  (none) Author Type:  Certified Medical Assistant     Filed:  05/23/17 09:54 AM Encounter Date:  5/22/2017 Status:  Signed     :  Bozena Mcguire RMA (Certified Medical Assistant)            order was faxed  closing[TS1.1M]      Revision History        User Key Date/Time User Provider Type Action    > TS1.1 05/23/17 09:54 AM Bozena Mcguire RMA Certified Medical Assistant Sign    M - Manual             Telemetry

## 2023-04-15 NOTE — ED ADULT TRIAGE NOTE - CHIEF COMPLAINT QUOTE
pt  BIB EMS with AMS after waking at around 1630 LKN this am at 11:00 , slurred speech, hx of triple a, not on AC use

## 2023-04-15 NOTE — ED PROVIDER NOTE - CLINICAL SUMMARY MEDICAL DECISION MAKING FREE TEXT BOX
pt presented today brought in from home after being found altered with slurred speech at 4pm, pt was last seen normal at 11am, pt speech improved in the ER, on exam pt is alert and oriented x 3, NIHSS-0, ct stroke negative, however, aneurysm noted again on Ct, same size of 5.8 as on prior exam, pt son is aware of it and states that pt presented today brought in from home after being found altered with slurred speech at 4pm, pt was last seen normal at 11am, pt speech improved in the ER, on exam pt is alert and oriented x 3, NIHSS-0, ct stroke negative, however, aneurysm noted again on Ct, same size of 5.8 as on prior exam, pt son is aware of it and his son states that he did not was anything done, pts cardiologist is Dr Mcarthur at [facility]

## 2023-04-15 NOTE — H&P ADULT - HISTORY OF PRESENT ILLNESS
Chief Complaint: code: stroke.    The patient is a 88y Male with significant PMH of aortic aneurysm (currently being followed by cardiology) and pancreatitis presented in ED via EMS with c/o some confusion, change in his mental status, some slurred speech and generalized weakness. He also had unsteady gait and had near syncope at home. He did not fall on the floor as his son was able to catch him prior to him falling down. Patient is not able to call what happened.  Upon arrival in ED, code stroke was alerted. But in ED, he was AAO x4. He has no any slurred speech or any focal motor deficits. He is answering all questions appropriately. His NIH score was 0 in ED.  He denies chest pain, fever, chills, sob, headache, N/V, abdominal pain, diarrhea or dysuria. He says that he does not any medications at home. He denies smoking, alcohol or substance abuse.    CT head negative for any acute finding.    CTA head and neck:   CTA brain: No hemodynamically significant stenosis    CTA carotid/vertebral artery circulation: There is a marked ascending   aortic aneurysm measuring 5.8 x 5.6 cm in diameter. No hemodynamically   significant stenosis    CT Perfusion: Normal.    Significant labs: CBC and CMP within normal limit.

## 2023-04-15 NOTE — ED ADULT NURSE NOTE - OBJECTIVE STATEMENT
as per pt's son, "he was sitting down at home and he started having slurred speech and unsteady gate that started at 4pm and he couldn't stand", stroke code called , pt had CT scan done, pt placed on CCM, non labored breathing on room air

## 2023-04-16 LAB
A1C WITH ESTIMATED AVERAGE GLUCOSE RESULT: 5.2 % — SIGNIFICANT CHANGE UP (ref 4–5.6)
ANION GAP SERPL CALC-SCNC: 8 MMOL/L — SIGNIFICANT CHANGE UP (ref 5–17)
BUN SERPL-MCNC: 9 MG/DL — SIGNIFICANT CHANGE UP (ref 7–23)
CALCIUM SERPL-MCNC: 9.1 MG/DL — SIGNIFICANT CHANGE UP (ref 8.5–10.1)
CHLORIDE SERPL-SCNC: 108 MMOL/L — SIGNIFICANT CHANGE UP (ref 96–108)
CHOLEST SERPL-MCNC: 164 MG/DL — SIGNIFICANT CHANGE UP
CO2 SERPL-SCNC: 24 MMOL/L — SIGNIFICANT CHANGE UP (ref 22–31)
CREAT SERPL-MCNC: 0.61 MG/DL — SIGNIFICANT CHANGE UP (ref 0.5–1.3)
EGFR: 94 ML/MIN/1.73M2 — SIGNIFICANT CHANGE UP
ESTIMATED AVERAGE GLUCOSE: 103 MG/DL — SIGNIFICANT CHANGE UP (ref 68–114)
GLUCOSE SERPL-MCNC: 68 MG/DL — LOW (ref 70–99)
HDLC SERPL-MCNC: 91 MG/DL — SIGNIFICANT CHANGE UP
LIPID PNL WITH DIRECT LDL SERPL: 62 MG/DL — SIGNIFICANT CHANGE UP
NON HDL CHOLESTEROL: 73 MG/DL — SIGNIFICANT CHANGE UP
POTASSIUM SERPL-MCNC: 3.4 MMOL/L — LOW (ref 3.5–5.3)
POTASSIUM SERPL-SCNC: 3.4 MMOL/L — LOW (ref 3.5–5.3)
SODIUM SERPL-SCNC: 140 MMOL/L — SIGNIFICANT CHANGE UP (ref 135–145)
TRIGL SERPL-MCNC: 54 MG/DL — SIGNIFICANT CHANGE UP

## 2023-04-16 PROCEDURE — 99233 SBSQ HOSP IP/OBS HIGH 50: CPT

## 2023-04-16 PROCEDURE — 70551 MRI BRAIN STEM W/O DYE: CPT | Mod: 26

## 2023-04-16 RX ORDER — LANOLIN ALCOHOL/MO/W.PET/CERES
3 CREAM (GRAM) TOPICAL ONCE
Refills: 0 | Status: COMPLETED | OUTPATIENT
Start: 2023-04-16 | End: 2023-04-16

## 2023-04-16 RX ORDER — POTASSIUM CHLORIDE 20 MEQ
40 PACKET (EA) ORAL ONCE
Refills: 0 | Status: COMPLETED | OUTPATIENT
Start: 2023-04-16 | End: 2023-04-16

## 2023-04-16 RX ORDER — LOSARTAN POTASSIUM 100 MG/1
1 TABLET, FILM COATED ORAL
Refills: 0 | DISCHARGE

## 2023-04-16 RX ADMIN — ENOXAPARIN SODIUM 40 MILLIGRAM(S): 100 INJECTION SUBCUTANEOUS at 05:00

## 2023-04-16 RX ADMIN — Medication 81 MILLIGRAM(S): at 11:20

## 2023-04-16 RX ADMIN — Medication 3 MILLIGRAM(S): at 21:10

## 2023-04-16 RX ADMIN — SODIUM CHLORIDE 75 MILLILITER(S): 9 INJECTION INTRAMUSCULAR; INTRAVENOUS; SUBCUTANEOUS at 05:00

## 2023-04-16 RX ADMIN — Medication 40 MILLIEQUIVALENT(S): at 11:20

## 2023-04-16 RX ADMIN — ATORVASTATIN CALCIUM 40 MILLIGRAM(S): 80 TABLET, FILM COATED ORAL at 21:10

## 2023-04-16 NOTE — ED ADULT NURSE REASSESSMENT NOTE - NS ED NURSE REASSESS COMMENT FT1
Pt AOx4 and resting comfortably in bed. Pt reports no acute distress at this time. Will continue to monitor. Pt verbalizes understanding that he will be admitted to the hospital

## 2023-04-16 NOTE — PATIENT PROFILE ADULT - FALL HARM RISK - RISK INTERVENTIONS
Assistance OOB with selected safe patient handling equipment/Communicate Fall Risk and Risk Factors to all staff, patient, and family/Discuss with provider need for PT consult/Monitor gait and stability/Provide patient with walking aids - walker, cane, crutches/Reinforce activity limits and safety measures with patient and family/Use of alarms - bed, chair and/or voice tab/Visual Cue: Yellow wristband/Bed in lowest position, wheels locked, appropriate side rails in place/Call bell, personal items and telephone in reach/Instruct patient to call for assistance before getting out of bed or chair/Non-slip footwear when patient is out of bed/[place] to call system/Physically safe environment - no spills, clutter or unnecessary equipment/Purposeful Proactive Rounding/Room/bathroom lighting operational, light cord in reach

## 2023-04-17 ENCOUNTER — TRANSCRIPTION ENCOUNTER (OUTPATIENT)
Age: 88
End: 2023-04-17

## 2023-04-17 VITALS
RESPIRATION RATE: 18 BRPM | DIASTOLIC BLOOD PRESSURE: 88 MMHG | TEMPERATURE: 99 F | HEART RATE: 86 BPM | SYSTOLIC BLOOD PRESSURE: 158 MMHG | OXYGEN SATURATION: 95 %

## 2023-04-17 PROCEDURE — 93306 TTE W/DOPPLER COMPLETE: CPT | Mod: 26

## 2023-04-17 PROCEDURE — 99238 HOSP IP/OBS DSCHRG MGMT 30/<: CPT

## 2023-04-17 RX ORDER — ATORVASTATIN CALCIUM 80 MG/1
1 TABLET, FILM COATED ORAL
Qty: 30 | Refills: 0
Start: 2023-04-17 | End: 2023-05-16

## 2023-04-17 RX ORDER — ASPIRIN/CALCIUM CARB/MAGNESIUM 324 MG
1 TABLET ORAL
Qty: 30 | Refills: 0
Start: 2023-04-17 | End: 2023-05-16

## 2023-04-17 RX ADMIN — ENOXAPARIN SODIUM 40 MILLIGRAM(S): 100 INJECTION SUBCUTANEOUS at 05:53

## 2023-04-17 RX ADMIN — Medication 81 MILLIGRAM(S): at 11:07

## 2023-04-17 RX ADMIN — SODIUM CHLORIDE 75 MILLILITER(S): 9 INJECTION INTRAMUSCULAR; INTRAVENOUS; SUBCUTANEOUS at 07:17

## 2023-04-17 NOTE — OCCUPATIONAL THERAPY INITIAL EVALUATION ADULT - SOCIAL CONCERNS
Pt is more  confused due to new surrounding and care givers./Complex psychosocial needs/coping issues

## 2023-04-17 NOTE — OCCUPATIONAL THERAPY INITIAL EVALUATION ADULT - TRANSFER TRAINING, PT EVAL
Pt will transfer to all surfaces, safely and independently with  least restrictive adaptive device within 2 weeks.

## 2023-04-17 NOTE — PROGRESS NOTE ADULT - ASSESSMENT
88 year old male with h/o aortic aneurysm with episode of slurred speech and unsteady gait, resolved. PE now non-focal NIHSS 0  CTH, CTA /N no acute findings    Impression: possible TIA    MRI negative  Aspirin 81 for now  LDL 62, no need for statin, unless was on at home  TTE here or as an outpt  Follow up with his cardiologist/Cardiothoracic surgeon  Can follow up with Neurology, Dr. Anselmo Celestin at [phone]  
The patient is a 88y Male with significant PMH of aortic aneurysm (currently being followed by cardiology) and pancreatitis presented in ED via EMS with c/o some confusion, change in his mental status, some slurred speech and generalized weakness. He also had unsteady gait and had near syncope at home. He did not fall on the floor as his son was able to catch him prior to him falling down. Patient is not able to call what happened.  Upon arrival in ED, code stroke was alerted. But in ED, he was AAO x4. He has no any slurred speech or any focal motor deficits. He is answering all questions appropriately. His NIH score was 0 in ED.  He denies chest pain, fever, chills, sob, headache, N/V, abdominal pain, diarrhea or dysuria. He says that he does not any medications at home. He denies smoking, alcohol or substance abuse.      # TIA vs Syncope.  Most likely TIA.  c/w telemetry, no events thus far  passed bedside swallow, started regular diet   Vitals and neuro-checks per protocol.  Lipid panel, A1c level and UA.  MRI brain and 2D Echo in am.  ASA and Statin started,  PT/OT.  neurology consult appreciated    #hypokalemia   - s/p repletion      # H/o Ascending aortic aneurysm  CTA shows aneurysm measuring 5.8 x 5.6 cm in diameter.  Follows with cardiology as outpatient.    # DVT prophylaxis: Lovenox sq daily.    Plan of care d/w the patient in detail at bedside, and he verbalized understanding.

## 2023-04-17 NOTE — PHYSICAL THERAPY INITIAL EVALUATION ADULT - TINETTI BALANCE TEST, REHAB EVAL
Sitting Balance -1 /1 , Rises From Chair -1 /2, Attempts to rise -2 /2 , Immediate Standing Balance - 2/2,  Standing Balance - 2/2, Nudged -  2/2, Eyes Closed -1 /1,  Turning 360 Deg - 1 /2, Sitting down - 2/2, Balance Score -14 /16

## 2023-04-17 NOTE — OCCUPATIONAL THERAPY INITIAL EVALUATION ADULT - PERTINENT HX OF CURRENT PROBLEM, REHAB EVAL
Pt is an 85 y/o male who presented to ER due to acute onset of neurological deficit. Pt is diagnosed with brain TIA and syncope_. MRI on 4/16/23 results confirm there is no abnormal restricted diffusion to suggest acute infarction Pt has h/o aortic aneurysm (currently being followed by cardiology) and pancreatitis Pt is an 85 y/o male who presented to ER due to acute onset of neurological deficit. Pt is diagnosed with brain TIA and syncope. MRI on 4/16/23 results confirm there is no abnormal restricted diffusion to suggest acute infarction Pt has h/o aortic aneurysm (currently being followed by cardiology) and pancreatitis

## 2023-04-17 NOTE — PHYSICAL THERAPY INITIAL EVALUATION ADULT - PERTINENT HX OF CURRENT PROBLEM, REHAB EVAL
88 year old male with h/o aortic aneurysm with episode of slurred speech and unsteady gait, resolved. PE now non-focal NIHSS 0  CTH, CTA /N no acute findings.

## 2023-04-17 NOTE — OCCUPATIONAL THERAPY INITIAL EVALUATION ADULT - ADDITIONAL COMMENTS
Prior to admission, Pt was functioning in his roles, self sufficient & ambulating independently without any assistive devices. Pt stated he uses a SAC in the community. Presently pt needs assistance with lower body self care tasks due to, weakness. Dexterity and fine motor skills are intact bilaterally. Pt uses both hands reciprocally and crosses. Pt navigates around barriers in his surroundings. Pt is right hand dominant and wears glasses for reading. Prior to admission, Pt was functioning in his roles, self sufficient & ambulating independently without any assistive devices. Pt stated he uses a SAC in the community. Presently pt needs assistance with lower body self care tasks due to, weakness. Dexterity and fine motor skills are intact bilaterally. Pt uses both hands reciprocally and crosses midline. Pt navigates around barriers in his surroundings without any loss of balance. Pt is right hand dominant and wears glasses for reading.

## 2023-04-17 NOTE — OCCUPATIONAL THERAPY INITIAL EVALUATION ADULT - GENERAL OBSERVATIONS, REHAB EVAL
Pt was seen for initial OT consult, encountered OOB to chair on cardiac monitoring in NAD; pt was AA&Ox3, confused , forgertful , but, cooperative & followed commands. Pt make needs & wants moves BUE/ BLE without difficulties and integrates both sides of his body. Pt presents with mild generalized weakness ; this limits pt's activity tolerance ,balance, ADL management and functional mobility

## 2023-04-17 NOTE — OCCUPATIONAL THERAPY INITIAL EVALUATION ADULT - RANGE OF MOTION EXAMINATION, LOWER EXTREMITY
Home
bilateral LE Active ROM was WFL  (within functional limits)/bilateral LE Passive ROM was WFL  (within functional limits)

## 2023-04-17 NOTE — DISCHARGE NOTE PROVIDER - NSDCQMANTITHROM_GEN_A_CORE
Problem: Occupational Therapy Goal  Goal: Occupational Therapy Goal  Outcome: Outcome(s) achieved Date Met: 02/14/18  OT evaluation completed and pt d/c'ed 2/14/2018 as pt with no further acute OT needs at this time. Please re-consult if functional status changed and pt with increased need for assistance.        Yes

## 2023-04-17 NOTE — PROGRESS NOTE ADULT - SUBJECTIVE AND OBJECTIVE BOX
Patient is a 88y old  Male who presents with a chief complaint of Code stroke (15 Apr 2023 21:06)      SUBJECTIVE / OVERNIGHT EVENTS: Patient seen and examined at bedside. State that he feels well denies any complaints, no acute events overnight     ROS:  All other review of systems negative    Allergies    No Known Allergies    Intolerances        MEDICATIONS  (STANDING):  aspirin enteric coated 81 milliGRAM(s) Oral daily  atorvastatin 40 milliGRAM(s) Oral at bedtime  enoxaparin Injectable 40 milliGRAM(s) SubCutaneous every 24 hours  potassium chloride    Tablet ER 40 milliEquivalent(s) Oral once  sodium chloride 0.9%. 1000 milliLiter(s) (75 mL/Hr) IV Continuous <Continuous>    MEDICATIONS  (PRN):      Vital Signs Last 24 Hrs  T(C): 36.9 (16 Apr 2023 04:57), Max: 36.9 (16 Apr 2023 04:57)  T(F): 98.4 (16 Apr 2023 04:57), Max: 98.4 (16 Apr 2023 04:57)  HR: 80 (16 Apr 2023 07:02) (50 - 93)  BP: 157/75 (16 Apr 2023 04:57) (122/74 - 157/75)  BP(mean): --  RR: 18 (16 Apr 2023 04:57) (11 - 18)  SpO2: 95% (16 Apr 2023 04:57) (88% - 98%)    Parameters below as of 16 Apr 2023 04:57  Patient On (Oxygen Delivery Method): room air      CAPILLARY BLOOD GLUCOSE      POCT Blood Glucose.: 92 mg/dL (15 Apr 2023 18:12)    I&O's Summary    15 Apr 2023 07:01  -  16 Apr 2023 07:00  --------------------------------------------------------  IN: 0 mL / OUT: 200 mL / NET: -200 mL        PHYSICAL EXAM:  GENERAL: elderly   HEAD:  Atraumatic, Normocephalic  EYES: EOMI, PERRLA, conjunctiva and sclera clear  NECK: Supple, No JVD  CHEST/LUNG: Clear to auscultation bilaterally; No wheeze  HEART: Regular rate and rhythm; No murmurs, rubs, or gallops  ABDOMEN: Soft, Nontender, Nondistended; Bowel sounds present  EXTREMITIES:  2+ Peripheral Pulses, No clubbing, cyanosis, or edema  NEUROLOGY: AAOx3, non-focal      LABS:                        15.6   7.63  )-----------( 278      ( 15 Apr 2023 18:30 )             48.5     04-16    140  |  108  |  9   ----------------------------<  68<L>  3.4<L>   |  24  |  0.61    Ca    9.1      16 Apr 2023 05:40    TPro  7.4  /  Alb  3.5  /  TBili  0.6  /  DBili  x   /  AST  15  /  ALT  13  /  AlkPhos  73  04-15    PT/INR - ( 15 Apr 2023 20:55 )   PT: 13.3 sec;   INR: 1.11 ratio         PTT - ( 15 Apr 2023 20:55 )  PTT:28.0 sec          Neurology rec noted 
Patient seen and examined this am. No new events    MEDICATIONS:    aspirin enteric coated 81 milliGRAM(s) Oral daily  atorvastatin 40 milliGRAM(s) Oral at bedtime  enoxaparin Injectable 40 milliGRAM(s) SubCutaneous every 24 hours  LORazepam   Injectable 2 milliGRAM(s) IntraMuscular once  sodium chloride 0.9%. 1000 milliLiter(s) IV Continuous <Continuous>      LABS:                          15.6   7.63  )-----------( 278      ( 15 Apr 2023 18:30 )             48.5     04-16    140  |  108  |  9   ----------------------------<  68<L>  3.4<L>   |  24  |  0.61    Ca    9.1      16 Apr 2023 05:40    TPro  7.4  /  Alb  3.5  /  TBili  0.6  /  DBili  x   /  AST  15  /  ALT  13  /  AlkPhos  73  04-15    CAPILLARY BLOOD GLUCOSE        PT/INR - ( 15 Apr 2023 20:55 )   PT: 13.3 sec;   INR: 1.11 ratio         PTT - ( 15 Apr 2023 20:55 )  PTT:28.0 sec    I&O's Summary    Vital Signs Last 24 Hrs  T(C): 37.2 (17 Apr 2023 10:31), Max: 37.2 (17 Apr 2023 10:31)  T(F): 99 (17 Apr 2023 10:31), Max: 99 (17 Apr 2023 10:31)  HR: 86 (17 Apr 2023 10:31) (67 - 99)  BP: 158/88 (17 Apr 2023 10:31) (137/67 - 177/87)  BP(mean): --  RR: 18 (17 Apr 2023 10:31) (15 - 18)  SpO2: 95% (17 Apr 2023 10:31) (95% - 97%)    Parameters below as of 17 Apr 2023 09:00  Patient On (Oxygen Delivery Method): room air          On Neurological Examination:    Mental Status - Patient is alert, awake, oriented X3. fluent, names, no dysarthria no aphasia Follows commands well and able to answer questions appropriately. Mood and affect  normal    Cranial Nerves - PERRL, EOMI, VFF, V1-V3 intact, no gross facial asymmetry, tongue/uvula midline    Motor Exam -   no drift  Sensory    Intact to light touch and pinprick bilaterally    Coord: FTN intact bilaterally     Gait -  deferred     RADIOLOGY  CTH < from: CT Brain Perfusion Maps Stroke (04.15.23 @ 18:33) >  IMPRESSION:    CTA brain: No hemodynamically significant stenosis    CTA carotid/vertebral artery circulation: There is a marked ascending   aortic aneurysm measuring 5.8 x 5.6 cm in diameter. No hemodynamically   significant stenosis    CT Perfusion: Normal    --- End of Report ---        < end of copied text >        < from: MR Head No Cont (04.16.23 @ 14:06) >  IMPRESSION: No acute infarction.    --- End of Report ---      < end of copied text >

## 2023-04-17 NOTE — DISCHARGE NOTE NURSING/CASE MANAGEMENT/SOCIAL WORK - CAREGIVER NAME
What Is The Reason For Today's Visit?: History of Melanoma Year Excised?: 1999 [place] Depth?: 0.9mm Najma barbosa

## 2023-04-17 NOTE — DISCHARGE NOTE PROVIDER - CARE PROVIDER_API CALL
Anselmo Celestin)  Neurology; Neurophysiology  [address] [facility] [address], [address]  [place] [address]  Phone: [phone]  Fax: [phone]  Follow Up Time: 1 week

## 2023-04-17 NOTE — PHYSICAL THERAPY INITIAL EVALUATION ADULT - GENERAL OBSERVATIONS, REHAB EVAL
Pt was seeb sitting in bedside chair c cardiac monitor + and chair alarm+, alert and oriented x3, confused at times and forgetful. Pt was comfortable and motivated.

## 2023-04-17 NOTE — DISCHARGE NOTE PROVIDER - NSDCCPCAREPLAN_GEN_ALL_CORE_FT
PRINCIPAL DISCHARGE DIAGNOSIS  Diagnosis: Brain TIA  Assessment and Plan of Treatment: you were diagnosed with a Mini stroke  we  consulted Neurology  MRI and Echocardiogram was consulted  please continue baby aspirin and atorvastatin for stroke prevention      SECONDARY DISCHARGE DIAGNOSES  Diagnosis: Syncope  Assessment and Plan of Treatment:

## 2023-04-17 NOTE — OCCUPATIONAL THERAPY INITIAL EVALUATION ADULT - LIVES WITH, PROFILE
in a private house with no steps to enter. Once inside, pt has to negotiate a flight of stairs to access the bedroom , bathroom and other flight to the basement. Pt stays on the main floor where most living amenities are located on one level.    The bathroom has a walk in shower, fixed shower head  and standard toilet./spouse in a private house with no steps to enter. Once inside, pt has to negotiate a flight of stairs to access the bedroom, bathroom and other flight to the basement. Pt stated he stays on the main floor where most living amenities are located on one level. The bathroom has a walk in shower, fixed shower head  and standard toilet./spouse

## 2023-04-17 NOTE — DISCHARGE NOTE PROVIDER - HOSPITAL COURSE
8y Male with significant PMH of aortic aneurysm (currently being followed by cardiology) and pancreatitis presented in ED via EMS with c/o some confusion, change in his mental status, some slurred speech and generalized weakness. He also had unsteady gait and had near syncope at home. He did not fall on the floor as his son was able to catch him prior to him falling down. Patient is not able to call what happened.  Upon arrival in ED, code stroke was alerted. But in ED, he was AAO x4. He has no any slurred speech or any focal motor deficits. He is answering all questions appropriately. His NIH score was 0 in ED.  He denies chest pain, fever, chills, sob, headache, N/V, abdominal pain, diarrhea or dysuria. He says that he does not any medications at home. He denies smoking, alcohol or substance abuse.      # TIA vs Syncope.  Most likely TIA.  c/w telemetry, no events thus far  passed bedside swallow, started regular diet   Vitals and neuro-checks per protocol.  Lipid panel, A1c level and UA.  MRI brain and 2D Echo in am.  ASA and Statin started,  PT/OT.  neurology consult appreciated    #hypokalemia   - s/p repletion      # H/o Ascending aortic aneurysm  CTA shows aneurysm measuring 5.8 x 5.6 cm in diameter.  Follows with cardiology as outpatient.    # DVT prophylaxis: Lovenox sq daily.    Plan of care d/w the patient in detail at bedside, and he verbalized understanding.

## 2023-04-17 NOTE — DISCHARGE NOTE PROVIDER - NSDCMRMEDTOKEN_GEN_ALL_CORE_FT
aspirin 81 mg oral delayed release tablet: 1 tab(s) orally once a day  atorvastatin 40 mg oral tablet: 1 tab(s) orally once a day (at bedtime)  losartan 25 mg oral tablet: 1 orally  metoprolol succinate 12.5mg daily:

## 2023-04-17 NOTE — PHYSICAL THERAPY INITIAL EVALUATION ADULT - TINETTI GAIT TEST, REHAB EVAL
Indication of gait -1/1,   Step Length and height -2/2,   Foot Clearance -2/2,   Step Symmetry -0 /1,  Step Continuity -1/1,   Path -2/ 2,   Trunk -1/2,   Walking Time -1/1,   Total Score 10 /12

## 2023-04-17 NOTE — PHYSICAL THERAPY INITIAL EVALUATION ADULT - ADDITIONAL COMMENTS
As per pt : Pt lives in a private house with no steps to enter. Once inside, pt has to negotiate a flight of stairs to access the bedroom, bathroom and other flight to the basement. Pt stated he stays on the main floor where most living amenities are located on one level.

## 2023-04-17 NOTE — DISCHARGE NOTE NURSING/CASE MANAGEMENT/SOCIAL WORK - PATIENT PORTAL LINK FT
You can access the FollowMyHealth Patient Portal offered by [facility] by registering at the following website: [email or web address]. By joining [facility]’s FollowMyHealth portal, you will also be able to view your health information using other applications (apps) compatible with our system.

## 2023-04-21 DIAGNOSIS — E87.6 HYPOKALEMIA: ICD-10-CM

## 2023-04-21 DIAGNOSIS — G45.9 TRANSIENT CEREBRAL ISCHEMIC ATTACK, UNSPECIFIED: ICD-10-CM

## 2023-04-21 DIAGNOSIS — I71.21 ANEURYSM OF THE ASCENDING AORTA, WITHOUT RUPTURE: ICD-10-CM

## 2023-04-21 DIAGNOSIS — Z87.891 PERSONAL HISTORY OF NICOTINE DEPENDENCE: ICD-10-CM

## 2023-06-16 NOTE — DISCHARGE NOTE PROVIDER - NSDCACTIVITY_GEN_ALL_CORE
Noa Ring, Thanks!  Gearrdo Alan, PHARMD, BCPS, BCACP   No restrictions